# Patient Record
Sex: MALE | Race: OTHER | NOT HISPANIC OR LATINO
[De-identification: names, ages, dates, MRNs, and addresses within clinical notes are randomized per-mention and may not be internally consistent; named-entity substitution may affect disease eponyms.]

---

## 2017-01-03 ENCOUNTER — APPOINTMENT (OUTPATIENT)
Dept: PEDIATRIC GASTROENTEROLOGY | Facility: CLINIC | Age: 22
End: 2017-01-03

## 2017-01-03 VITALS
DIASTOLIC BLOOD PRESSURE: 78 MMHG | SYSTOLIC BLOOD PRESSURE: 117 MMHG | BODY MASS INDEX: 19.32 KG/M2 | HEART RATE: 82 BPM | WEIGHT: 139.55 LBS | HEIGHT: 71.18 IN

## 2017-01-03 DIAGNOSIS — R63.4 ABNORMAL WEIGHT LOSS: ICD-10-CM

## 2017-01-04 LAB
25(OH)D3 SERPL-MCNC: 20.5 NG/ML
ALBUMIN SERPL ELPH-MCNC: 4.6 G/DL
ALP BLD-CCNC: 58 U/L
ALT SERPL-CCNC: 8 U/L
ANION GAP SERPL CALC-SCNC: 12 MMOL/L
AST SERPL-CCNC: 14 U/L
BASOPHILS # BLD AUTO: 0.03 K/UL
BASOPHILS NFR BLD AUTO: 0.3 %
BILIRUB SERPL-MCNC: 1.1 MG/DL
BUN SERPL-MCNC: 10 MG/DL
CALCIUM SERPL-MCNC: 10 MG/DL
CHLORIDE SERPL-SCNC: 104 MMOL/L
CO2 SERPL-SCNC: 25 MMOL/L
CREAT SERPL-MCNC: 1.08 MG/DL
CRP SERPL-MCNC: <0.2 MG/DL
EOSINOPHIL # BLD AUTO: 0.54 K/UL
EOSINOPHIL NFR BLD AUTO: 4.9 %
FOLATE SERPL-MCNC: 16.2 NG/ML
HCT VFR BLD CALC: 47.3 %
HGB BLD-MCNC: 16 G/DL
IMM GRANULOCYTES NFR BLD AUTO: 0.2 %
IRON SATN MFR SERPL: 24 %
IRON SERPL-MCNC: 84 UG/DL
LYMPHOCYTES # BLD AUTO: 1.91 K/UL
LYMPHOCYTES NFR BLD AUTO: 17.3 %
MAN DIFF?: NORMAL
MCHC RBC-ENTMCNC: 28.8 PG
MCHC RBC-ENTMCNC: 33.8 GM/DL
MCV RBC AUTO: 85.1 FL
MONOCYTES # BLD AUTO: 0.78 K/UL
MONOCYTES NFR BLD AUTO: 7.1 %
NEUTROPHILS # BLD AUTO: 7.77 K/UL
NEUTROPHILS NFR BLD AUTO: 70.2 %
PLATELET # BLD AUTO: 325 K/UL
POTASSIUM SERPL-SCNC: 5.4 MMOL/L
PROT SERPL-MCNC: 7.8 G/DL
RBC # BLD: 5.56 M/UL
RBC # FLD: 12.5 %
SODIUM SERPL-SCNC: 141 MMOL/L
TIBC SERPL-MCNC: 351 UG/DL
UIBC SERPL-MCNC: 267 UG/DL
VIT B12 SERPL-MCNC: 685 PG/ML
WBC # FLD AUTO: 11.05 K/UL

## 2017-05-19 ENCOUNTER — APPOINTMENT (OUTPATIENT)
Dept: INTERNAL MEDICINE | Facility: CLINIC | Age: 22
End: 2017-05-19

## 2017-05-19 VITALS
RESPIRATION RATE: 14 BRPM | OXYGEN SATURATION: 98 % | WEIGHT: 129 LBS | TEMPERATURE: 98.8 F | HEART RATE: 108 BPM | DIASTOLIC BLOOD PRESSURE: 80 MMHG | HEIGHT: 72 IN | SYSTOLIC BLOOD PRESSURE: 110 MMHG | BODY MASS INDEX: 17.47 KG/M2

## 2017-05-19 DIAGNOSIS — F17.200 NICOTINE DEPENDENCE, UNSPECIFIED, UNCOMPLICATED: ICD-10-CM

## 2017-05-19 DIAGNOSIS — Z87.09 PERSONAL HISTORY OF OTHER DISEASES OF THE RESPIRATORY SYSTEM: ICD-10-CM

## 2017-05-19 LAB
FLUAV SPEC QL CULT: NORMAL
FLUBV AG SPEC QL IA: NORMAL

## 2017-05-19 RX ORDER — SODIUM FLUORIDE 1.1 G/100G
1.1 GEL, DENTIFRICE ORAL
Qty: 51 | Refills: 0 | Status: DISCONTINUED | COMMUNITY
Start: 2017-01-27

## 2018-05-31 ENCOUNTER — APPOINTMENT (OUTPATIENT)
Dept: INTERNAL MEDICINE | Facility: CLINIC | Age: 23
End: 2018-05-31
Payer: SELF-PAY

## 2018-05-31 VITALS
HEART RATE: 82 BPM | HEIGHT: 72 IN | WEIGHT: 124 LBS | OXYGEN SATURATION: 98 % | SYSTOLIC BLOOD PRESSURE: 120 MMHG | RESPIRATION RATE: 14 BRPM | TEMPERATURE: 98.2 F | DIASTOLIC BLOOD PRESSURE: 70 MMHG | BODY MASS INDEX: 16.8 KG/M2

## 2018-05-31 PROCEDURE — 99213 OFFICE O/P EST LOW 20 MIN: CPT

## 2018-05-31 RX ORDER — AMOXICILLIN AND CLAVULANATE POTASSIUM 875; 125 MG/1; MG/1
875-125 TABLET, COATED ORAL
Qty: 20 | Refills: 0 | Status: DISCONTINUED | COMMUNITY
Start: 2017-05-19 | End: 2018-05-31

## 2018-05-31 NOTE — REVIEW OF SYSTEMS
[Abdominal Pain] : abdominal pain [Nausea] : no nausea [Constipation] : no constipation [Diarrhea] : diarrhea [Vomiting] : no vomiting [Heartburn] : no heartburn [Melena] : no melena [Negative] : Heme/Lymph

## 2018-05-31 NOTE — PHYSICAL EXAM
[No Acute Distress] : no acute distress [Well-Appearing] : well-appearing [Normal Oropharynx] : the oropharynx was normal [Normal TMs] : both tympanic membranes were normal [Supple] : supple [No Lymphadenopathy] : no lymphadenopathy [No Respiratory Distress] : no respiratory distress  [Clear to Auscultation] : lungs were clear to auscultation bilaterally [Normal Rate] : normal rate  [Regular Rhythm] : with a regular rhythm [No Edema] : there was no peripheral edema [Soft] : abdomen soft [Non Tender] : non-tender [Normal Gait] : normal gait [Coordination Grossly Intact] : coordination grossly intact [Normal Affect] : the affect was normal [Normal Insight/Judgement] : insight and judgment were intact [de-identified] : thin cuong

## 2018-05-31 NOTE — HISTORY OF PRESENT ILLNESS
[FreeTextEntry1] : f/u Crohn's [de-identified] : h/o Crohn's with recent diarrhea which is improving. he tries to eat more to gain weight. Goes to the gym regularly. He has diarrhea about 3x daily now. No blood in stool. He was having crampy pain like his typical Crohn's symptoms. He is feeling a lot better now and wants to return to work.\par \par Denies fever, chills, recent travel. He does not have a gastroenterologist at this time. \par \par He works for [employer] and goes to school.

## 2018-06-07 ENCOUNTER — APPOINTMENT (OUTPATIENT)
Dept: INTERNAL MEDICINE | Facility: CLINIC | Age: 23
End: 2018-06-07
Payer: SELF-PAY

## 2018-06-07 VITALS
TEMPERATURE: 98.7 F | HEIGHT: 72 IN | RESPIRATION RATE: 14 BRPM | DIASTOLIC BLOOD PRESSURE: 60 MMHG | OXYGEN SATURATION: 98 % | SYSTOLIC BLOOD PRESSURE: 98 MMHG | WEIGHT: 124 LBS | BODY MASS INDEX: 16.8 KG/M2 | HEART RATE: 76 BPM

## 2018-06-07 DIAGNOSIS — Z87.09 PERSONAL HISTORY OF OTHER DISEASES OF THE RESPIRATORY SYSTEM: ICD-10-CM

## 2018-06-07 LAB — S PYO AG SPEC QL IA: NORMAL

## 2018-06-07 PROCEDURE — 87880 STREP A ASSAY W/OPTIC: CPT | Mod: QW

## 2018-06-07 PROCEDURE — 99213 OFFICE O/P EST LOW 20 MIN: CPT

## 2018-06-07 NOTE — HISTORY OF PRESENT ILLNESS
[Sore Throat] : sore throat [FreeTextEntry8] : c/o fever to 102 and s/t since Saturday, 5-6 days. Taking ibuprofen. No diarrhea, but has fever. Has sores in his mouth too.

## 2018-06-07 NOTE — REVIEW OF SYSTEMS
[Fever] : fever [Fatigue] : fatigue [Earache] : no earache [Sore Throat] : sore throat [Cough] : no cough [Constipation] : constipation [Negative] : Heme/Lymph

## 2018-06-07 NOTE — PHYSICAL EXAM
[No Acute Distress] : no acute distress [Normal TMs] : both tympanic membranes were normal [No Respiratory Distress] : no respiratory distress  [Clear to Auscultation] : lungs were clear to auscultation bilaterally [Normal Rate] : normal rate  [Regular Rhythm] : with a regular rhythm [No Edema] : there was no peripheral edema [Soft] : abdomen soft [Non Tender] : non-tender [de-identified] : thin male [de-identified] : canker sores in mouth - inner lip areas [de-identified] : few small anterior cervical LN's

## 2018-08-24 ENCOUNTER — APPOINTMENT (OUTPATIENT)
Dept: INTERNAL MEDICINE | Facility: CLINIC | Age: 23
End: 2018-08-24
Payer: SELF-PAY

## 2018-08-24 ENCOUNTER — APPOINTMENT (OUTPATIENT)
Dept: INTERNAL MEDICINE | Facility: CLINIC | Age: 23
End: 2018-08-24

## 2018-08-24 VITALS
HEIGHT: 72 IN | DIASTOLIC BLOOD PRESSURE: 62 MMHG | TEMPERATURE: 98.3 F | OXYGEN SATURATION: 99 % | WEIGHT: 122 LBS | SYSTOLIC BLOOD PRESSURE: 120 MMHG | RESPIRATION RATE: 14 BRPM | BODY MASS INDEX: 16.52 KG/M2 | HEART RATE: 64 BPM

## 2018-08-24 DIAGNOSIS — R07.89 OTHER CHEST PAIN: ICD-10-CM

## 2018-08-24 PROCEDURE — 99213 OFFICE O/P EST LOW 20 MIN: CPT

## 2018-08-24 NOTE — HISTORY OF PRESENT ILLNESS
[FreeTextEntry8] : c/o a month of right upper chest pain. He feels it more if he takes a deep breath. Last time was this morning and 2 days prior to that. Lasts 10 - 15 minutes. He sings and gets it at that time. He runs and exercises and doesn't feel it then. Has a cough sometimes. Has mucus recently. Smokes once in a while. \par \par Has had Crohn's symptoms for last 3 weeks. Doesn't have active insurance until 9/1/18.

## 2018-08-24 NOTE — PHYSICAL EXAM
[No Acute Distress] : no acute distress [Well Nourished] : well nourished [Well-Appearing] : well-appearing [Normal Oropharynx] : the oropharynx was normal [No Respiratory Distress] : no respiratory distress  [Clear to Auscultation] : lungs were clear to auscultation bilaterally [Normal Rate] : normal rate  [Regular Rhythm] : with a regular rhythm [No Edema] : there was no peripheral edema [Soft] : abdomen soft [Non Tender] : non-tender [Normal Gait] : normal gait [Coordination Grossly Intact] : coordination grossly intact [Normal Affect] : the affect was normal [Normal Insight/Judgement] : insight and judgment were intact [de-identified] : thin male [de-identified] : enlarged tonsils [de-identified] : points to localized area of right upper chest where he has discomfort on and off. states it is tender when he pushes on it when he has discomfort.

## 2019-03-01 ENCOUNTER — OUTPATIENT (OUTPATIENT)
Dept: OUTPATIENT SERVICES | Facility: HOSPITAL | Age: 24
LOS: 1 days | End: 2019-03-01
Payer: MEDICAID

## 2019-03-01 PROCEDURE — G9001: CPT

## 2019-03-07 DIAGNOSIS — Z71.89 OTHER SPECIFIED COUNSELING: ICD-10-CM

## 2019-04-02 ENCOUNTER — APPOINTMENT (OUTPATIENT)
Dept: PEDIATRIC GASTROENTEROLOGY | Facility: CLINIC | Age: 24
End: 2019-04-02
Payer: MEDICAID

## 2019-04-02 PROCEDURE — 99211 OFF/OP EST MAY X REQ PHY/QHP: CPT

## 2019-09-13 ENCOUNTER — APPOINTMENT (OUTPATIENT)
Dept: GASTROENTEROLOGY | Facility: CLINIC | Age: 24
End: 2019-09-13
Payer: MEDICAID

## 2019-09-13 VITALS
DIASTOLIC BLOOD PRESSURE: 62 MMHG | WEIGHT: 127 LBS | BODY MASS INDEX: 16.83 KG/M2 | HEIGHT: 73 IN | TEMPERATURE: 97.4 F | HEART RATE: 63 BPM | RESPIRATION RATE: 16 BRPM | OXYGEN SATURATION: 98 % | SYSTOLIC BLOOD PRESSURE: 110 MMHG

## 2019-09-13 PROCEDURE — 99204 OFFICE O/P NEW MOD 45 MIN: CPT

## 2019-09-13 RX ORDER — FLUTICASONE PROPIONATE 50 UG/1
50 SPRAY, METERED NASAL DAILY
Qty: 1 | Refills: 1 | Status: DISCONTINUED | COMMUNITY
Start: 2017-05-19 | End: 2019-09-13

## 2019-09-13 NOTE — ASSESSMENT
[FreeTextEntry1] : 24-year-old male small bowel and colonic Crohn's disease by history, mild chronic complaints since discontinuing Remicade maintenance\par \par Plan\par Background blood testing as ordered\par Indications risks benefits and alternatives to surveillance colonoscopy reviewed\par Patient agreeable to examination\par \par Decisions regarding treatment following above workup

## 2019-09-13 NOTE — PHYSICAL EXAM
[General Appearance - In No Acute Distress] : in no acute distress [General Appearance - Alert] : alert [Sclera] : the sclera and conjunctiva were normal [PERRL With Normal Accommodation] : pupils were equal in size, round, and reactive to light [Extraocular Movements] : extraocular movements were intact [Outer Ear] : the ears and nose were normal in appearance [Oropharynx] : the oropharynx was normal [Neck Appearance] : the appearance of the neck was normal [Neck Cervical Mass (___cm)] : no neck mass was observed [Jugular Venous Distention Increased] : there was no jugular-venous distention [Thyroid Diffuse Enlargement] : the thyroid was not enlarged [Thyroid Nodule] : there were no palpable thyroid nodules [Auscultation Breath Sounds / Voice Sounds] : lungs were clear to auscultation bilaterally [Heart Rate And Rhythm] : heart rate was normal and rhythm regular [Heart Sounds] : normal S1 and S2 [Heart Sounds Gallop] : no gallops [Murmurs] : no murmurs [Heart Sounds Pericardial Friction Rub] : no pericardial rub [Edema] : there was no peripheral edema [Bowel Sounds] : normal bowel sounds [Abdomen Soft] : soft [Abdomen Tenderness] : non-tender [Cervical Lymph Nodes Enlarged Posterior Bilaterally] : posterior cervical [Abdomen Mass (___ Cm)] : no abdominal mass palpated [Cervical Lymph Nodes Enlarged Anterior Bilaterally] : anterior cervical [Supraclavicular Lymph Nodes Enlarged Bilaterally] : supraclavicular [Axillary Lymph Nodes Enlarged Bilaterally] : axillary [Femoral Lymph Nodes Enlarged Bilaterally] : femoral [Inguinal Lymph Nodes Enlarged Bilaterally] : inguinal [No CVA Tenderness] : no ~M costovertebral angle tenderness [No Spinal Tenderness] : no spinal tenderness [Nail Clubbing] : no clubbing  or cyanosis of the fingernails [Abnormal Walk] : normal gait [Motor Tone] : muscle strength and tone were normal [Musculoskeletal - Swelling] : no joint swelling seen [Skin Color & Pigmentation] : normal skin color and pigmentation [Skin Turgor] : normal skin turgor [] : no rash [Oriented To Time, Place, And Person] : oriented to person, place, and time [Impaired Insight] : insight and judgment were intact [Affect] : the affect was normal

## 2019-09-13 NOTE — HISTORY OF PRESENT ILLNESS
[de-identified] : 24-year-old male, history of Crohn's disease small intestine and colon since the age of 16 years presents as transition of care from pediatrics. Not seen or treated for almost 2 years.\par \par Patient initial diagnosis, diarrhea, abdominal pain.\par Initial treatment with prednisone, failure of methotrexate, transition to Remicade which he used successfully for several years..\par Patient electively discontinued the medication\par Recent pre-followup with his pediatric gastroenterologist for referral\par \par Patient Gen. he states that he has been doing well off Remicade with occasional flares.\par 4-5 bowel movements, typically soft, more frequent in the morning\par Generally with abdominal pain, some occasional nausea, or complaints treated with as needed cannabis\par Patient denies the use of any CBD oil\par Denies any other over-the-counter remedies\par \par Weight stable\par Palpation general he describes his health is good, controlled with diet, does report that he missed school in the spring because of complaints\par \par Denies any family history of Crohn's or ulcerative colitis\par Denies tobacco smoking\par Denies any present or past extraintestinal manifestations of disease\par Denies any perianal fistula\par \par Social history student, musician, singer

## 2019-09-16 ENCOUNTER — CLINICAL ADVICE (OUTPATIENT)
Age: 24
End: 2019-09-16

## 2019-09-16 LAB
25(OH)D3 SERPL-MCNC: 14.1 NG/ML
ALBUMIN SERPL ELPH-MCNC: 5 G/DL
ALP BLD-CCNC: 61 U/L
ALT SERPL-CCNC: 13 U/L
ANION GAP SERPL CALC-SCNC: 16 MMOL/L
AST SERPL-CCNC: 18 U/L
BASOPHILS # BLD AUTO: 0.06 K/UL
BASOPHILS NFR BLD AUTO: 1.2 %
BILIRUB SERPL-MCNC: 0.5 MG/DL
BUN SERPL-MCNC: 8 MG/DL
CALCIUM SERPL-MCNC: 10 MG/DL
CHLORIDE SERPL-SCNC: 103 MMOL/L
CO2 SERPL-SCNC: 23 MMOL/L
CREAT SERPL-MCNC: 0.87 MG/DL
CRP SERPL-MCNC: <0.1 MG/DL
EOSINOPHIL # BLD AUTO: 0.25 K/UL
EOSINOPHIL NFR BLD AUTO: 5.1 %
GLUCOSE SERPL-MCNC: 77 MG/DL
HBV CORE IGG+IGM SER QL: NONREACTIVE
HBV SURFACE AB SER QL: REACTIVE
HBV SURFACE AG SER QL: NONREACTIVE
HCT VFR BLD CALC: 51.5 %
HGB BLD-MCNC: 16.1 G/DL
IMM GRANULOCYTES NFR BLD AUTO: 0.2 %
LYMPHOCYTES # BLD AUTO: 1.84 K/UL
LYMPHOCYTES NFR BLD AUTO: 37.2 %
MAN DIFF?: NORMAL
MCHC RBC-ENTMCNC: 28.4 PG
MCHC RBC-ENTMCNC: 31.3 GM/DL
MCV RBC AUTO: 91 FL
MONOCYTES # BLD AUTO: 0.45 K/UL
MONOCYTES NFR BLD AUTO: 9.1 %
NEUTROPHILS # BLD AUTO: 2.33 K/UL
NEUTROPHILS NFR BLD AUTO: 47.2 %
PLATELET # BLD AUTO: 295 K/UL
POTASSIUM SERPL-SCNC: 4.5 MMOL/L
PROT SERPL-MCNC: 8.3 G/DL
RBC # BLD: 5.66 M/UL
RBC # FLD: 12.7 %
SODIUM SERPL-SCNC: 142 MMOL/L
VIT B12 SERPL-MCNC: 648 PG/ML
WBC # FLD AUTO: 4.94 K/UL

## 2019-09-18 LAB
M TB IFN-G BLD-IMP: NEGATIVE
QUANTIFERON TB PLUS MITOGEN MINUS NIL: 8.15 IU/ML
QUANTIFERON TB PLUS NIL: 0.01 IU/ML
QUANTIFERON TB PLUS TB1 MINUS NIL: 0 IU/ML
QUANTIFERON TB PLUS TB2 MINUS NIL: 0 IU/ML

## 2019-09-20 LAB
INFLIXIMAB AB SERPL-MCNC: <22 NG/ML
INFLIXIMAB SERPL-MCNC: <0.4 UG/ML

## 2019-09-23 ENCOUNTER — APPOINTMENT (OUTPATIENT)
Dept: ORTHOPEDIC SURGERY | Facility: CLINIC | Age: 24
End: 2019-09-23

## 2020-05-28 ENCOUNTER — EMERGENCY (EMERGENCY)
Facility: HOSPITAL | Age: 25
LOS: 1 days | Discharge: HOME | End: 2020-05-28
Attending: EMERGENCY MEDICINE
Payer: MEDICAID

## 2020-05-28 VITALS
DIASTOLIC BLOOD PRESSURE: 78 MMHG | HEIGHT: 73 IN | HEART RATE: 59 BPM | RESPIRATION RATE: 16 BRPM | OXYGEN SATURATION: 99 % | TEMPERATURE: 98 F | WEIGHT: 130.07 LBS | SYSTOLIC BLOOD PRESSURE: 128 MMHG

## 2020-05-28 VITALS
OXYGEN SATURATION: 99 % | RESPIRATION RATE: 16 BRPM | TEMPERATURE: 99 F | DIASTOLIC BLOOD PRESSURE: 85 MMHG | SYSTOLIC BLOOD PRESSURE: 129 MMHG | HEART RATE: 57 BPM

## 2020-05-28 LAB — SARS-COV-2 RNA SPEC QL NAA+PROBE: SIGNIFICANT CHANGE UP

## 2020-05-28 PROCEDURE — 99284 EMERGENCY DEPT VISIT MOD MDM: CPT

## 2020-05-28 PROCEDURE — 99283 EMERGENCY DEPT VISIT LOW MDM: CPT

## 2020-05-28 RX ORDER — IBUPROFEN 200 MG
600 TABLET ORAL ONCE
Refills: 0 | Status: COMPLETED | OUTPATIENT
Start: 2020-05-28 | End: 2020-05-28

## 2020-05-28 RX ADMIN — Medication 600 MILLIGRAM(S): at 19:34

## 2020-05-28 NOTE — ED PROVIDER NOTE - NSFOLLOWUPCLINICS_GEN_ALL_ED_FT
[facility]  Dental  [address] [place], [place] [address]  Phone: [phone]  Fax:   Follow Up Time: 7-10 Days

## 2020-05-28 NOTE — ED PROVIDER NOTE - PATIENT PORTAL LINK FT
You can access the FollowMyHealth Patient Portal offered by [facility] by registering at the following website: [email or web address]. By joining [employer]’s FollowMyHealth portal, you will also be able to view your health information using other applications (apps) compatible with our system.

## 2020-05-28 NOTE — ED ADULT NURSE NOTE - OBJECTIVE STATEMENT
Pt is a 25y Male c/o L upper posterior molar pain which has not changed in the past few weeks, but pt states he has been unable to secure a dental appointment until June 15th and pt states the pain makes eating difficult. Pt PMHx Crohn's, Dental caries. Pt had his wisdom teeth removed approximately 1 year ago. Pt denies any trauma to the area. Pt requesting another clinic to follow up with regarding his pain. Pt has minimal swelling and the area is not reddened. Pt states the area bleeds occasionally when brushing. Pt prefers to go by [facility]. Pt resting comfortably in bed. Pt educated on call bell use and call bell placed at bedside. Pt safety maintained.

## 2020-05-28 NOTE — ED PROVIDER NOTE - NSFOLLOWUPINSTRUCTIONS_ED_ALL_ED_FT
Follow up with your primary doctor in 3-5 days    Follow up with the dental clinic as soon as possible    Motrin 600 mg every 8 hours as needed for pain    Return to the ER for vomiting, fevers, discharge, facial swelling, or difficulty breathing/swallowing

## 2020-05-28 NOTE — ED PROVIDER NOTE - OBJECTIVE STATEMENT
25 year old M with pmhx of Chron's (not on any meds) coming in for x3 weeks of left sided upper molar pain, worse with eating and cold foods. Pt also reports intermittent bleeding from gingival bleeding when brushing teeth. Taking Tylenol and Motrin prn for pain. Denies fever, chills, difficulty breathing, nausea, vomiting. Not on any immunosuppressants.  Has not been able to f/u with dentist due to COVID-19, but has no symptoms concerning for that.

## 2020-05-28 NOTE — ED PROVIDER NOTE - NS_ ATTENDINGSCRIBEDETAILS _ED_A_ED_FT
Wyatt LOZOYA: The scribe's documentation has been prepared under my direction and personally reviewed by me in its entirety. I confirm that the note above accurately reflects all work, treatment, procedures, and medical decision making performed by me (Dr. Schneider).

## 2020-05-28 NOTE — ED PROVIDER NOTE - PHYSICAL EXAMINATION
GENERAL: AAOx4, GCS 15, NAD, WDWN;   HEENT:  No pain with percussion of teeth. No evidence of abscess or gingival erythema. No facial pain or swelling. No sinus tenderness. Benign ear exam. MMM, supple neck.  PULM: CTA B, no crackles/rubs/rales;   CV: RRR, S1S2, no MRG;   ABD: Flat abdomen, NTND, no R/G/R, no CVAT.    MSK: MOSS, +2 pulses x4;    NEURO: No obvious focal deficits;   PSYCH: AAOx3, clear thought and normal sensorium.

## 2020-05-28 NOTE — ED PROVIDER NOTE - CLINICAL SUMMARY MEDICAL DECISION MAKING FREE TEXT BOX
Subacute dental pain, likely from pulpitis vs dental caries that do not require treatment beyond pain meds. Will test for COVID-19 to facilitate outpt f/u. No indication for further workup or treatment. Subacute dental pain, likely from pulpitis vs dental caries that do not require treatment beyond pain meds. Will test for COVID-19 to facilitate outpt f/u. No indication for further workup or treatment.  --BMM

## 2020-06-08 ENCOUNTER — APPOINTMENT (OUTPATIENT)
Dept: ORTHOPEDIC SURGERY | Facility: CLINIC | Age: 25
End: 2020-06-08
Payer: MEDICAID

## 2020-06-08 VITALS — HEIGHT: 73 IN | WEIGHT: 135 LBS | BODY MASS INDEX: 17.89 KG/M2

## 2020-06-08 VITALS — HEART RATE: 66 BPM | DIASTOLIC BLOOD PRESSURE: 60 MMHG | SYSTOLIC BLOOD PRESSURE: 115 MMHG

## 2020-06-08 VITALS — TEMPERATURE: 98.9 F

## 2020-06-08 DIAGNOSIS — M54.5 LOW BACK PAIN: ICD-10-CM

## 2020-06-08 PROCEDURE — 72100 X-RAY EXAM L-S SPINE 2/3 VWS: CPT

## 2020-06-08 PROCEDURE — 99204 OFFICE O/P NEW MOD 45 MIN: CPT

## 2020-07-06 PROBLEM — K02.9 DENTAL CARIES, UNSPECIFIED: Chronic | Status: ACTIVE | Noted: 2020-05-28

## 2020-07-06 PROBLEM — K50.90 CROHN'S DISEASE, UNSPECIFIED, WITHOUT COMPLICATIONS: Chronic | Status: ACTIVE | Noted: 2020-05-28

## 2020-08-11 ENCOUNTER — APPOINTMENT (OUTPATIENT)
Dept: PULMONOLOGY | Facility: CLINIC | Age: 25
End: 2020-08-11
Payer: MEDICAID

## 2020-08-11 VITALS
DIASTOLIC BLOOD PRESSURE: 60 MMHG | SYSTOLIC BLOOD PRESSURE: 110 MMHG | RESPIRATION RATE: 16 BRPM | BODY MASS INDEX: 17.89 KG/M2 | TEMPERATURE: 99.3 F | WEIGHT: 135 LBS | HEIGHT: 73 IN | OXYGEN SATURATION: 98 % | HEART RATE: 88 BPM

## 2020-08-11 PROCEDURE — 99204 OFFICE O/P NEW MOD 45 MIN: CPT | Mod: 25

## 2020-08-11 PROCEDURE — 99406 BEHAV CHNG SMOKING 3-10 MIN: CPT

## 2020-08-11 NOTE — HISTORY OF PRESENT ILLNESS
[Current] : current [TextBox_4] : Patient is a 25 year old male Hx Crohn's disease, current everyday marijuana smoker, presents to [facility] complaint right sided  chest congestion. Patient reports he has experienced discomfort on right side of his chest for approximately 6 weeks.  Patietn denies cough, shortness of breath.  he reports no aggravating factors.  He smokes marijuana daily.  He denies fever, chills, weight loss or other systemic complaints.

## 2020-08-11 NOTE — PHYSICAL EXAM
[No Acute Distress] : no acute distress [Normal Oropharynx] : normal oropharynx [Normal Appearance] : normal appearance [Normal Rate/Rhythm] : normal rate/rhythm [No Neck Mass] : no neck mass [Normal S1, S2] : normal s1, s2 [No Murmurs] : no murmurs [No Resp Distress] : no resp distress [Clear to Auscultation Bilaterally] : clear to auscultation bilaterally [No Abnormalities] : no abnormalities [Benign] : benign [Normal Gait] : normal gait [No Clubbing] : no clubbing [No Cyanosis] : no cyanosis [No Edema] : no edema [FROM] : FROM [Normal Color/ Pigmentation] : normal color/ pigmentation [No Focal Deficits] : no focal deficits [Oriented x3] : oriented x3 [Normal Affect] : normal affect

## 2020-08-11 NOTE — COUNSELING
[Risk of tobacco use and health benefits of smoking cessation discussed] : Risk of tobacco use and health benefits of smoking cessation discussed [Willing to Quit Smoking] : Not willing to quit smoking [Tobacco Use Cessation Intermediate Greater Than 3 Minutes Up to 10 Minutes] : Tobacco Use Cessation Intermediate Greater Than 3 Minutes Up to 10 Minutes [Patient motivation] : Patient motivation [Good understanding] : Patient has a good understanding of lifestyle changes and steps needed to achieve self management goal

## 2020-08-15 ENCOUNTER — OUTPATIENT (OUTPATIENT)
Dept: OUTPATIENT SERVICES | Facility: HOSPITAL | Age: 25
LOS: 1 days | End: 2020-08-15
Payer: MEDICAID

## 2020-08-15 ENCOUNTER — APPOINTMENT (OUTPATIENT)
Dept: RADIOLOGY | Facility: CLINIC | Age: 25
End: 2020-08-15
Payer: MEDICAID

## 2020-08-15 DIAGNOSIS — Z00.8 ENCOUNTER FOR OTHER GENERAL EXAMINATION: ICD-10-CM

## 2020-08-15 PROCEDURE — 71046 X-RAY EXAM CHEST 2 VIEWS: CPT | Mod: 26

## 2020-08-15 PROCEDURE — 71046 X-RAY EXAM CHEST 2 VIEWS: CPT

## 2020-10-16 ENCOUNTER — APPOINTMENT (OUTPATIENT)
Age: 25
End: 2020-10-16
Payer: MEDICAID

## 2020-10-16 VITALS
HEIGHT: 70 IN | WEIGHT: 133 LBS | BODY MASS INDEX: 19.04 KG/M2 | SYSTOLIC BLOOD PRESSURE: 112 MMHG | RESPIRATION RATE: 16 BRPM | OXYGEN SATURATION: 98 % | TEMPERATURE: 98 F | DIASTOLIC BLOOD PRESSURE: 70 MMHG | HEART RATE: 67 BPM

## 2020-10-16 PROCEDURE — 99406 BEHAV CHNG SMOKING 3-10 MIN: CPT

## 2020-10-16 PROCEDURE — 99214 OFFICE O/P EST MOD 30 MIN: CPT | Mod: 25

## 2020-10-16 NOTE — COUNSELING
[Good understanding] : Patient has a good understanding of lifestyle changes and steps needed to achieve self management goal [Patient motivation] : Patient motivation [Risk of tobacco use and health benefits of smoking cessation discussed] : Risk of tobacco use and health benefits of smoking cessation discussed [Tobacco Use Cessation Intermediate Greater Than 3 Minutes Up to 10 Minutes] : Tobacco Use Cessation Intermediate Greater Than 3 Minutes Up to 10 Minutes [Willing to Quit Smoking] : Not willing to quit smoking

## 2020-10-16 NOTE — PHYSICAL EXAM
[No Acute Distress] : no acute distress [Normal Oropharynx] : normal oropharynx [Normal Appearance] : normal appearance [Normal Rate/Rhythm] : normal rate/rhythm [No Neck Mass] : no neck mass [Normal S1, S2] : normal s1, s2 [No Murmurs] : no murmurs [No Resp Distress] : no resp distress [Benign] : benign [No Abnormalities] : no abnormalities [Clear to Auscultation Bilaterally] : clear to auscultation bilaterally [Normal Gait] : normal gait [No Clubbing] : no clubbing [No Edema] : no edema [No Cyanosis] : no cyanosis [FROM] : FROM [Normal Color/ Pigmentation] : normal color/ pigmentation [No Focal Deficits] : no focal deficits [Oriented x3] : oriented x3 [Normal Affect] : normal affect

## 2020-10-16 NOTE — ASSESSMENT
[FreeTextEntry1] : 25 year old male Hx Crohn's disease worsening diarrhea abdominal pain presents for follow up\par \par Attempt to facilitate GI appointment, scheduled with Dr. Olivas January 2021\par Marijuana smoking discussed\par \par Follow up prn\par \par \par

## 2020-10-16 NOTE — HISTORY OF PRESENT ILLNESS
[Current] : current [TextBox_4] : Patient is a 25 year old male Hx Crohn's disease, Hx childhood asthma, current everyday marijuana smoker, presents to [facility] for follow up. Patietn reports he is experiencing worsening abdominal pain. Pain located right lower quadrant and radiates toward flank.  Patient also reporting worsening diarrhea.  He feels he is short of breath at times from pain.  He smokes marijuana and this helps his pain.  He is here for follow up.

## 2020-10-27 ENCOUNTER — APPOINTMENT (OUTPATIENT)
Dept: GASTROENTEROLOGY | Facility: CLINIC | Age: 25
End: 2020-10-27
Payer: MEDICAID

## 2020-10-27 VITALS
SYSTOLIC BLOOD PRESSURE: 110 MMHG | WEIGHT: 133 LBS | DIASTOLIC BLOOD PRESSURE: 70 MMHG | TEMPERATURE: 96.9 F | RESPIRATION RATE: 16 BRPM | BODY MASS INDEX: 19.04 KG/M2 | HEART RATE: 72 BPM | HEIGHT: 70 IN | OXYGEN SATURATION: 98 %

## 2020-10-27 DIAGNOSIS — R10.31 RIGHT LOWER QUADRANT PAIN: ICD-10-CM

## 2020-10-27 LAB
BASOPHILS # BLD AUTO: 0.07 K/UL
BASOPHILS NFR BLD AUTO: 1.1 %
EOSINOPHIL # BLD AUTO: 0.46 K/UL
EOSINOPHIL NFR BLD AUTO: 7.5 %
HCT VFR BLD CALC: 48.4 %
HGB BLD-MCNC: 15.4 G/DL
IMM GRANULOCYTES NFR BLD AUTO: 0.2 %
LYMPHOCYTES # BLD AUTO: 2.01 K/UL
LYMPHOCYTES NFR BLD AUTO: 32.6 %
MAN DIFF?: NORMAL
MCHC RBC-ENTMCNC: 28.7 PG
MCHC RBC-ENTMCNC: 31.8 GM/DL
MCV RBC AUTO: 90.1 FL
MONOCYTES # BLD AUTO: 0.51 K/UL
MONOCYTES NFR BLD AUTO: 8.3 %
NEUTROPHILS # BLD AUTO: 3.11 K/UL
NEUTROPHILS NFR BLD AUTO: 50.3 %
PLATELET # BLD AUTO: 270 K/UL
RBC # BLD: 5.37 M/UL
RBC # FLD: 12.2 %
WBC # FLD AUTO: 6.17 K/UL

## 2020-10-27 PROCEDURE — 99214 OFFICE O/P EST MOD 30 MIN: CPT

## 2020-10-27 PROCEDURE — 99072 ADDL SUPL MATRL&STAF TM PHE: CPT

## 2020-10-27 NOTE — HISTORY OF PRESENT ILLNESS
[de-identified] : dictation inactive\par \par 25 yr male\par CD since age 16\par Electively stopped prior remicade years ago\par No history of surgery\par 6 months of RLQ abdominal pain\par Weight stable\par Denies diarrhea\par Cannabis daily\par Also RUQ pain worse with deep inspiration\par Pulmonary work up in progress\par \par  school for [employer] science, also [employer]\par \par

## 2020-10-27 NOTE — ASSESSMENT
[FreeTextEntry1] : Abdominal pain\par Suspect active Crohn's disease\par \par Plan \par MR enterography\par blood work as ordered\par If MR negative, colonoscopy as next step\par If positive, consider resuming biologic (such as stelara, humira)\par Patient understands and agrees

## 2020-10-27 NOTE — PHYSICAL EXAM
[General Appearance - Alert] : alert [General Appearance - In No Acute Distress] : in no acute distress [Sclera] : the sclera and conjunctiva were normal [PERRL With Normal Accommodation] : pupils were equal in size, round, and reactive to light [Extraocular Movements] : extraocular movements were intact [Outer Ear] : the ears and nose were normal in appearance [Oropharynx] : the oropharynx was normal [Neck Appearance] : the appearance of the neck was normal [Neck Cervical Mass (___cm)] : no neck mass was observed [Jugular Venous Distention Increased] : there was no jugular-venous distention [Thyroid Diffuse Enlargement] : the thyroid was not enlarged [Thyroid Nodule] : there were no palpable thyroid nodules [] : no respiratory distress [Auscultation Breath Sounds / Voice Sounds] : lungs were clear to auscultation bilaterally [Heart Rate And Rhythm] : heart rate was normal and rhythm regular [Heart Sounds] : normal S1 and S2 [Heart Sounds Gallop] : no gallops [Murmurs] : no murmurs [Heart Sounds Pericardial Friction Rub] : no pericardial rub [Abdomen Tenderness] : non-tender [Oriented To Time, Place, And Person] : oriented to person, place, and time [Impaired Insight] : insight and judgment were intact [Affect] : the affect was normal

## 2020-10-28 LAB
25(OH)D3 SERPL-MCNC: 13.6 NG/ML
ALBUMIN SERPL ELPH-MCNC: 4.6 G/DL
ALP BLD-CCNC: 61 U/L
ALT SERPL-CCNC: 10 U/L
ANION GAP SERPL CALC-SCNC: 14 MMOL/L
AST SERPL-CCNC: 15 U/L
BILIRUB SERPL-MCNC: 0.4 MG/DL
BUN SERPL-MCNC: 18 MG/DL
CALCIUM SERPL-MCNC: 9.6 MG/DL
CHLORIDE SERPL-SCNC: 103 MMOL/L
CO2 SERPL-SCNC: 25 MMOL/L
CREAT SERPL-MCNC: 1.02 MG/DL
CRP SERPL-MCNC: <0.1 MG/DL
GLUCOSE SERPL-MCNC: 65 MG/DL
POTASSIUM SERPL-SCNC: 4.8 MMOL/L
PROT SERPL-MCNC: 7.3 G/DL
SODIUM SERPL-SCNC: 142 MMOL/L
VIT B12 SERPL-MCNC: 503 PG/ML

## 2020-10-29 LAB
M TB IFN-G BLD-IMP: NEGATIVE
QUANTIFERON TB PLUS MITOGEN MINUS NIL: 1.59 IU/ML
QUANTIFERON TB PLUS NIL: 0.04 IU/ML
QUANTIFERON TB PLUS TB1 MINUS NIL: -0.01 IU/ML
QUANTIFERON TB PLUS TB2 MINUS NIL: -0.03 IU/ML

## 2020-12-01 ENCOUNTER — OUTPATIENT (OUTPATIENT)
Dept: OUTPATIENT SERVICES | Facility: HOSPITAL | Age: 25
LOS: 1 days | End: 2020-12-01
Payer: MEDICAID

## 2020-12-01 ENCOUNTER — RESULT REVIEW (OUTPATIENT)
Age: 25
End: 2020-12-01

## 2020-12-01 ENCOUNTER — APPOINTMENT (OUTPATIENT)
Dept: MRI IMAGING | Facility: CLINIC | Age: 25
End: 2020-12-01
Payer: MEDICAID

## 2020-12-01 DIAGNOSIS — R10.31 RIGHT LOWER QUADRANT PAIN: ICD-10-CM

## 2020-12-01 PROCEDURE — 72197 MRI PELVIS W/O & W/DYE: CPT | Mod: 26

## 2020-12-01 PROCEDURE — 74183 MRI ABD W/O CNTR FLWD CNTR: CPT

## 2020-12-01 PROCEDURE — 74183 MRI ABD W/O CNTR FLWD CNTR: CPT | Mod: 26

## 2020-12-01 PROCEDURE — 72197 MRI PELVIS W/O & W/DYE: CPT

## 2020-12-01 PROCEDURE — A9585: CPT

## 2020-12-07 ENCOUNTER — NON-APPOINTMENT (OUTPATIENT)
Age: 25
End: 2020-12-07

## 2020-12-15 PROBLEM — Z87.09 HISTORY OF ACUTE SINUSITIS: Status: RESOLVED | Noted: 2017-05-19 | Resolved: 2020-12-15

## 2020-12-16 PROBLEM — Z87.09 HISTORY OF ACUTE PHARYNGITIS: Status: RESOLVED | Noted: 2018-06-07 | Resolved: 2020-12-16

## 2020-12-24 ENCOUNTER — RX RENEWAL (OUTPATIENT)
Age: 25
End: 2020-12-24

## 2021-04-30 ENCOUNTER — APPOINTMENT (OUTPATIENT)
Dept: OTOLARYNGOLOGY | Facility: CLINIC | Age: 26
End: 2021-04-30

## 2021-06-16 ENCOUNTER — APPOINTMENT (OUTPATIENT)
Dept: OTOLARYNGOLOGY | Facility: CLINIC | Age: 26
End: 2021-06-16

## 2021-06-18 ENCOUNTER — APPOINTMENT (OUTPATIENT)
Dept: INTERNAL MEDICINE | Facility: CLINIC | Age: 26
End: 2021-06-18

## 2021-06-18 ENCOUNTER — APPOINTMENT (OUTPATIENT)
Dept: OTOLARYNGOLOGY | Facility: CLINIC | Age: 26
End: 2021-06-18
Payer: MEDICAID

## 2021-06-18 VITALS
BODY MASS INDEX: 18.28 KG/M2 | TEMPERATURE: 97.4 F | HEART RATE: 72 BPM | HEIGHT: 72 IN | DIASTOLIC BLOOD PRESSURE: 67 MMHG | WEIGHT: 135 LBS | SYSTOLIC BLOOD PRESSURE: 110 MMHG

## 2021-06-18 DIAGNOSIS — H90.3 SENSORINEURAL HEARING LOSS, BILATERAL: ICD-10-CM

## 2021-06-18 DIAGNOSIS — J31.2 CHRONIC PHARYNGITIS: ICD-10-CM

## 2021-06-18 DIAGNOSIS — H69.81 OTHER SPECIFIED DISORDERS OF EUSTACHIAN TUBE, RIGHT EAR: ICD-10-CM

## 2021-06-18 DIAGNOSIS — H92.01 OTALGIA, RIGHT EAR: ICD-10-CM

## 2021-06-18 DIAGNOSIS — G52.1 DISORDERS OF GLOSSOPHARYNGEAL NERVE: ICD-10-CM

## 2021-06-18 DIAGNOSIS — M26.609 UNSPECIFIED TEMPOROMANDIBULAR JOINT DISORDER: ICD-10-CM

## 2021-06-18 PROCEDURE — 31575 DIAGNOSTIC LARYNGOSCOPY: CPT

## 2021-06-18 PROCEDURE — 92557 COMPREHENSIVE HEARING TEST: CPT

## 2021-06-18 PROCEDURE — 99204 OFFICE O/P NEW MOD 45 MIN: CPT | Mod: 25

## 2021-06-18 PROCEDURE — 92567 TYMPANOMETRY: CPT

## 2021-06-18 NOTE — PHYSICAL EXAM
[Midline] : trachea located in midline position [Normal] : no rashes [de-identified] : asymmetric movement

## 2021-06-18 NOTE — PROCEDURE
[de-identified] : Indication for procedure:Unable to examine laryngeal structures with mirror exam.  Difficulty w persistent throat discomfort and excessive\par throat clearing\par Scope # 99\par Topical anesthesia with viscous xylocaine 2% is applied to the anterior nares.\par A flexible fiberoptic laryngoscope is than introduced through the nares.\par The nasopharynx is clear without mass or inflammation.\par The posterior pharyngeal wall is unremarkable.\par The tongue base and vallecula are unremarkable.  The hypopharynx is unremarkable and unobstructed.\par The supraglottic larynx is within normal limits.\par Both vocal cords are fully mobile with no nodule, polyp or other lesion present.\par There is no edema or erythema overlying the arytenoid cartilages or the inter-arytenoid space.\par The subglottic space is clear.\par The voice has a normal quality.\par

## 2021-06-18 NOTE — REASON FOR VISIT
[Initial Consultation] : an initial consultation for [Ear Pain] : ear pain [Tinnitus] : tinnitus [FreeTextEntry2] : ear

## 2021-06-18 NOTE — ASSESSMENT
[FreeTextEntry1] : tmj asymmetry\par exam ear and nasopharynx unremarkable\par audio wnl tymp a/a\par Recommend heating pad  twice a day, avoid chewing/soft diet\par Advil bid, rest jaw\par consider dental evaluation if not resolving\par ?allergy component to throat and nasal discomfort\par reconsult dental re tmj

## 2021-07-28 ENCOUNTER — APPOINTMENT (OUTPATIENT)
Dept: OTOLARYNGOLOGY | Facility: CLINIC | Age: 26
End: 2021-07-28

## 2021-08-19 ENCOUNTER — APPOINTMENT (OUTPATIENT)
Dept: NEUROLOGY | Facility: CLINIC | Age: 26
End: 2021-08-19
Payer: MEDICAID

## 2021-08-19 ENCOUNTER — NON-APPOINTMENT (OUTPATIENT)
Age: 26
End: 2021-08-19

## 2021-08-19 VITALS
SYSTOLIC BLOOD PRESSURE: 108 MMHG | HEART RATE: 76 BPM | WEIGHT: 140 LBS | TEMPERATURE: 97.2 F | BODY MASS INDEX: 18.96 KG/M2 | DIASTOLIC BLOOD PRESSURE: 67 MMHG | HEIGHT: 72 IN

## 2021-08-19 DIAGNOSIS — R51.9 HEADACHE, UNSPECIFIED: ICD-10-CM

## 2021-08-19 PROCEDURE — 99204 OFFICE O/P NEW MOD 45 MIN: CPT

## 2021-08-19 RX ORDER — SODIUM SULFATE, POTASSIUM SULFATE, MAGNESIUM SULFATE 17.5; 3.13; 1.6 G/ML; G/ML; G/ML
17.5-3.13-1.6 SOLUTION, CONCENTRATE ORAL
Qty: 1 | Refills: 0 | Status: DISCONTINUED | COMMUNITY
Start: 2019-09-13 | End: 2021-08-19

## 2021-08-19 RX ORDER — ERGOCALCIFEROL 1.25 MG/1
1.25 MG CAPSULE, LIQUID FILLED ORAL
Qty: 6 | Refills: 0 | Status: DISCONTINUED | COMMUNITY
Start: 2020-10-29 | End: 2021-08-19

## 2021-08-19 NOTE — HISTORY OF PRESENT ILLNESS
[FreeTextEntry1] : 26 year old man complaining of daily headaches now for several weeks, there was no triggering event, the reported trauma or injury.The pain starts at the right side at the base of the back of the head, radiates to behind the eye into the face.Dull achy, throbbing at times. Patient did feel lightheaded, dizzy, sound sensitive. Does not affect vision, redness or lacrimation noted of the right eye, no nasal stuffiness. There's no trigger by pressing on the face, brushing his teeth, chewing or swallowing. The pain will last all day in variable intensity, activity seems to be helpful.On occasions uses ibuprofen, which offers him some relief.\par Denies similar headaches in the past, no history of head injury, meningitis, sinus conditions, recently evaluated by ENT, no obvious abnormalities noted.Denies family history of migraine headaches.\par she also complains of right flank pain, radiating to the right hip area and groin area. No history of trauma,or injuries, no fever or chills denies malignancies. Not related to activity or movement,sometimes worsened by bowel movement. He has a history of Crohn's disease. He has been evaluated by , no obvious abnormalities noted

## 2021-08-19 NOTE — PHYSICAL EXAM
[General Appearance - Alert] : alert [General Appearance - In No Acute Distress] : in no acute distress [Oriented To Time, Place, And Person] : oriented to person, place, and time [Impaired Insight] : insight and judgment were intact [Affect] : the affect was normal [Person] : oriented to person [Place] : oriented to place [Time] : oriented to time [Concentration Intact] : normal concentrating ability [Visual Intact] : visual attention was ~T not ~L decreased [Naming Objects] : no difficulty naming common objects [Repeating Phrases] : no difficulty repeating a phrase [Writing A Sentence] : no difficulty writing a sentence [Fluency] : fluency intact [Comprehension] : comprehension intact [Reading] : reading intact [Past History] : adequate knowledge of personal past history [Cranial Nerves Oculomotor (III)] : extraocular motion intact [Cranial Nerves Optic (II)] : visual acuity intact bilaterally,  visual fields full to confrontation, pupils equal round and reactive to light [Cranial Nerves Trigeminal (V)] : facial sensation intact symmetrically [Cranial Nerves Facial (VII)] : face symmetrical [Cranial Nerves Glossopharyngeal (IX)] : tongue and palate midline [Cranial Nerves Vestibulocochlear (VIII)] : hearing was intact bilaterally [Cranial Nerves Accessory (XI - Cranial And Spinal)] : head turning and shoulder shrug symmetric [Cranial Nerves Hypoglossal (XII)] : there was no tongue deviation with protrusion [Motor Strength] : muscle strength was normal in all four extremities [No Muscle Atrophy] : normal bulk in all four extremities [Motor Handedness Right-Handed] : the patient is right hand dominant [Paresis Pronator Drift Right-Sided] : no pronator drift on the right [Paresis Pronator Drift Left-Sided] : no pronator drift on the left [Sensation Tactile Decrease] : light touch was intact [Balance] : balance was intact [Past-pointing] : there was no past-pointing [Tremor] : no tremor present [Dysdiadochokinesia Bilaterally] : not present [Coordination - Dysmetria Impaired Finger-to-Nose Bilateral] : not present [2+] : Ankle jerk left 2+ [Plantar Reflex Right Only] : normal on the right [Plantar Reflex Left Only] : normal on the left [Neck Appearance] : the appearance of the neck was normal [] : the neck was supple [Arterial Pulses Carotid] : carotid pulses were normal with no bruits [Full Pulse] : the pedal pulses are present [No Visual Abnormalities] : no visible abnormalities [No Tenderness to Palpation] : no spine tenderness on palpation [Full ROM] : full ROM [No Pain with ROM] : no pain with motion in any direction [Abnormal Walk] : normal gait [Nail Clubbing] : no clubbing  or cyanosis of the fingernails [Involuntary Movements] : no involuntary movements were seen [Musculoskeletal - Swelling] : no joint swelling seen [Motor Tone] : muscle strength and tone were normal [Skin Color & Pigmentation] : normal skin color and pigmentation [Skin Turgor] : normal skin turgor

## 2021-08-19 NOTE — ASSESSMENT
[FreeTextEntry1] : 26-year-old man with chronic daily headaches worse in intensity and frequency, only affecting the right side of the head. Examination is unremarkable. Probable primary headache, but would like to rule out an intracranial process.Rule out SOL.possible minor headache,hemicrania continua.\par Plan: MRI of brain,with and without gadolinium\par Samples of Ubrelvy 50 mg, one tablet p.o. p.r.n. headache, continued to hours.\par Sample of note the 75 mg, one tablet p.o. q.d. p.r.n. headache.\par return to office, 2- 3 weeks. [facility]

## 2021-08-19 NOTE — REVIEW OF SYSTEMS
[As Noted in HPI] : as noted in HPI [Neck Pain] : neck pain [Back Pain] : ~T back pain [Negative] : Heme/Lymph

## 2021-09-07 ENCOUNTER — NON-APPOINTMENT (OUTPATIENT)
Age: 26
End: 2021-09-07

## 2021-09-07 ENCOUNTER — APPOINTMENT (OUTPATIENT)
Dept: GASTROENTEROLOGY | Facility: CLINIC | Age: 26
End: 2021-09-07
Payer: MEDICAID

## 2021-09-07 VITALS
TEMPERATURE: 98.7 F | BODY MASS INDEX: 16.25 KG/M2 | SYSTOLIC BLOOD PRESSURE: 110 MMHG | DIASTOLIC BLOOD PRESSURE: 70 MMHG | WEIGHT: 120 LBS | OXYGEN SATURATION: 99 % | HEART RATE: 70 BPM | HEIGHT: 72 IN

## 2021-09-07 DIAGNOSIS — R10.9 UNSPECIFIED ABDOMINAL PAIN: ICD-10-CM

## 2021-09-07 DIAGNOSIS — K50.90 CROHN'S DISEASE, UNSPECIFIED, W/OUT COMPLICATIONS: ICD-10-CM

## 2021-09-07 LAB
25(OH)D3 SERPL-MCNC: 17.8 NG/ML
ALBUMIN SERPL ELPH-MCNC: 4.8 G/DL
ALP BLD-CCNC: 58 U/L
ALT SERPL-CCNC: 14 U/L
ANION GAP SERPL CALC-SCNC: 13 MMOL/L
AST SERPL-CCNC: 15 U/L
BASOPHILS # BLD AUTO: 0.06 K/UL
BASOPHILS NFR BLD AUTO: 1 %
BILIRUB SERPL-MCNC: 1.5 MG/DL
BUN SERPL-MCNC: 8 MG/DL
CALCIUM SERPL-MCNC: 10.4 MG/DL
CHLORIDE SERPL-SCNC: 105 MMOL/L
CO2 SERPL-SCNC: 24 MMOL/L
CREAT SERPL-MCNC: 1.01 MG/DL
CRP SERPL-MCNC: <3 MG/L
EOSINOPHIL # BLD AUTO: 0.24 K/UL
EOSINOPHIL NFR BLD AUTO: 4.1 %
GLUCOSE SERPL-MCNC: 87 MG/DL
HCT VFR BLD CALC: 46.8 %
HGB BLD-MCNC: 15.9 G/DL
IMM GRANULOCYTES NFR BLD AUTO: 0 %
LYMPHOCYTES # BLD AUTO: 1.79 K/UL
LYMPHOCYTES NFR BLD AUTO: 30.2 %
MAN DIFF?: NORMAL
MCHC RBC-ENTMCNC: 29.8 PG
MCHC RBC-ENTMCNC: 34 GM/DL
MCV RBC AUTO: 87.6 FL
MONOCYTES # BLD AUTO: 0.5 K/UL
MONOCYTES NFR BLD AUTO: 8.4 %
NEUTROPHILS # BLD AUTO: 3.33 K/UL
NEUTROPHILS NFR BLD AUTO: 56.3 %
PLATELET # BLD AUTO: 298 K/UL
POTASSIUM SERPL-SCNC: 4.6 MMOL/L
PROT SERPL-MCNC: 8.1 G/DL
RBC # BLD: 5.34 M/UL
RBC # FLD: 12.2 %
SODIUM SERPL-SCNC: 143 MMOL/L
VIT B12 SERPL-MCNC: 827 PG/ML
WBC # FLD AUTO: 5.92 K/UL

## 2021-09-07 PROCEDURE — 99214 OFFICE O/P EST MOD 30 MIN: CPT

## 2021-09-07 NOTE — ASSESSMENT
[FreeTextEntry1] : Weight loss, abdominal pain, suspect recurrent Crohn's\par \par Plan\par Blood testing as ordered\par MRI enterography as ordered\par Indications risks benefits and alternatives to colonoscopy reviewed\par Patient agreeable

## 2021-09-07 NOTE — PHYSICAL EXAM
[General Appearance - Alert] : alert [General Appearance - In No Acute Distress] : in no acute distress [Bowel Sounds] : normal bowel sounds [Abdomen Soft] : soft [FreeTextEntry1] : Mild tenderness right side without rebound or guarding [Oriented To Time, Place, And Person] : oriented to person, place, and time [Impaired Insight] : insight and judgment were intact [Affect] : the affect was normal

## 2021-09-07 NOTE — HISTORY OF PRESENT ILLNESS
[de-identified] : 26-year-old male,26-year-old male, several year history of isolated ileal Crohn's disease, diarrhea, weight loss history of small bowel Crohn's disease presents with recurrent abdominal pain, right-sided, weight loss\par \par Recent work-up for kidney stones negative including noncontrast CT of the abdomen\par \par Patient initially diagnosed at age 16\par Last colonoscopy 2015, no report of ileal intubation,: Normal

## 2021-09-08 LAB
M TB IFN-G BLD-IMP: NEGATIVE
QUANTIFERON TB PLUS MITOGEN MINUS NIL: 8.61 IU/ML
QUANTIFERON TB PLUS NIL: 0.01 IU/ML
QUANTIFERON TB PLUS TB1 MINUS NIL: 0 IU/ML
QUANTIFERON TB PLUS TB2 MINUS NIL: 0 IU/ML

## 2021-09-15 ENCOUNTER — APPOINTMENT (OUTPATIENT)
Dept: NEUROLOGY | Facility: CLINIC | Age: 26
End: 2021-09-15

## 2021-09-23 DIAGNOSIS — Z12.11 ENCOUNTER FOR SCREENING FOR MALIGNANT NEOPLASM OF COLON: ICD-10-CM

## 2021-09-27 ENCOUNTER — LABORATORY RESULT (OUTPATIENT)
Age: 26
End: 2021-09-27

## 2021-09-27 ENCOUNTER — APPOINTMENT (OUTPATIENT)
Dept: DISASTER EMERGENCY | Facility: CLINIC | Age: 26
End: 2021-09-27

## 2021-09-29 ENCOUNTER — APPOINTMENT (OUTPATIENT)
Dept: GASTROENTEROLOGY | Facility: AMBULATORY MEDICAL SERVICES | Age: 26
End: 2021-09-29
Payer: MEDICAID

## 2021-09-29 PROCEDURE — 45380 COLONOSCOPY AND BIOPSY: CPT

## 2021-10-05 ENCOUNTER — NON-APPOINTMENT (OUTPATIENT)
Age: 26
End: 2021-10-05

## 2021-10-05 ENCOUNTER — RESULT REVIEW (OUTPATIENT)
Age: 26
End: 2021-10-05

## 2021-10-29 ENCOUNTER — APPOINTMENT (OUTPATIENT)
Dept: MRI IMAGING | Facility: CLINIC | Age: 26
End: 2021-10-29

## 2021-11-12 ENCOUNTER — OUTPATIENT (OUTPATIENT)
Dept: OUTPATIENT SERVICES | Facility: HOSPITAL | Age: 26
LOS: 1 days | End: 2021-11-12
Payer: MEDICAID

## 2021-11-12 ENCOUNTER — APPOINTMENT (OUTPATIENT)
Dept: MRI IMAGING | Facility: CLINIC | Age: 26
End: 2021-11-12
Payer: MEDICAID

## 2021-11-12 DIAGNOSIS — K50.90 CROHN'S DISEASE, UNSPECIFIED, WITHOUT COMPLICATIONS: ICD-10-CM

## 2021-11-12 DIAGNOSIS — Z00.8 ENCOUNTER FOR OTHER GENERAL EXAMINATION: ICD-10-CM

## 2021-11-12 PROCEDURE — 74183 MRI ABD W/O CNTR FLWD CNTR: CPT | Mod: 26

## 2021-11-12 PROCEDURE — 74183 MRI ABD W/O CNTR FLWD CNTR: CPT

## 2021-11-12 PROCEDURE — 72196 MRI PELVIS W/DYE: CPT

## 2021-11-12 PROCEDURE — 72196 MRI PELVIS W/DYE: CPT | Mod: 26

## 2021-11-12 PROCEDURE — A9585: CPT

## 2021-11-17 ENCOUNTER — NON-APPOINTMENT (OUTPATIENT)
Age: 26
End: 2021-11-17

## 2022-01-21 ENCOUNTER — APPOINTMENT (OUTPATIENT)
Dept: PULMONOLOGY | Facility: CLINIC | Age: 27
End: 2022-01-21
Payer: MEDICAID

## 2022-01-21 VITALS
DIASTOLIC BLOOD PRESSURE: 70 MMHG | RESPIRATION RATE: 16 BRPM | WEIGHT: 125 LBS | HEIGHT: 72 IN | HEART RATE: 72 BPM | SYSTOLIC BLOOD PRESSURE: 104 MMHG | TEMPERATURE: 98 F | OXYGEN SATURATION: 99 % | BODY MASS INDEX: 16.93 KG/M2

## 2022-01-21 DIAGNOSIS — Z72.89 OTHER PROBLEMS RELATED TO LIFESTYLE: ICD-10-CM

## 2022-01-21 DIAGNOSIS — R07.89 OTHER CHEST PAIN: ICD-10-CM

## 2022-01-21 DIAGNOSIS — R05.3 CHRONIC COUGH: ICD-10-CM

## 2022-01-21 DIAGNOSIS — F12.90 CANNABIS USE, UNSPECIFIED, UNCOMPLICATED: ICD-10-CM

## 2022-01-21 PROCEDURE — 99214 OFFICE O/P EST MOD 30 MIN: CPT | Mod: 25

## 2022-01-21 PROCEDURE — 99406 BEHAV CHNG SMOKING 3-10 MIN: CPT

## 2022-01-21 RX ORDER — BUDESONIDE AND FORMOTEROL FUMARATE DIHYDRATE 160; 4.5 UG/1; UG/1
160-4.5 AEROSOL RESPIRATORY (INHALATION) TWICE DAILY
Qty: 1 | Refills: 1 | Status: COMPLETED | COMMUNITY
Start: 2022-01-21 | End: 2022-03-22

## 2022-01-21 NOTE — COUNSELING
[Patient motivation] : Patient motivation [Good understanding] : Patient has a good understanding of lifestyle changes and steps needed to achieve self management goal [FreeTextEntry1] : 4

## 2022-01-21 NOTE — ASSESSMENT
[FreeTextEntry1] : 26 Year old male Hx Crohn's disease, marijuana smoker presents with pleuritic chest pain, cough, shortness of breath\par \par Obtain CT angio evaluate for PE\par Initiate Symbicort 160/4.5 2 puffs  twice daily and rinse mouth\par PFT after CT angio\par \par Patient counseled on avoidance of marijuana products and smoking Of any kind including vaping\par \par Follow up pending CT angio

## 2022-01-21 NOTE — HISTORY OF PRESENT ILLNESS
[Current] : current [Former] : former [TextBox_4] : Patient is a 26 year old male Hx Crohn's disease, Hx childhood asthma, current everyday marijuana smoker, presents to [facility] for follow up. Patient reports he is vaccinated x 2.  He reports he is experiencing increased cough, pleuritic chest pain.  He reports he is not smoking marijuana regularly.  He had recent colonoscopy and MRI secondary active Crohn's.  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  He is here for these symptoms.

## 2022-07-07 ENCOUNTER — NON-APPOINTMENT (OUTPATIENT)
Age: 27
End: 2022-07-07

## 2022-07-08 ENCOUNTER — APPOINTMENT (OUTPATIENT)
Dept: RHEUMATOLOGY | Facility: CLINIC | Age: 27
End: 2022-07-08

## 2022-07-08 VITALS
BODY MASS INDEX: 17.61 KG/M2 | HEIGHT: 72 IN | TEMPERATURE: 96.1 F | OXYGEN SATURATION: 100 % | WEIGHT: 130 LBS | SYSTOLIC BLOOD PRESSURE: 100 MMHG | HEART RATE: 89 BPM | DIASTOLIC BLOOD PRESSURE: 70 MMHG

## 2022-07-08 DIAGNOSIS — R39.89 OTHER SYMPTOMS AND SIGNS INVOLVING THE GENITOURINARY SYSTEM: ICD-10-CM

## 2022-07-08 DIAGNOSIS — Z83.49 FAMILY HISTORY OF OTHER ENDOCRINE, NUTRITIONAL AND METABOLIC DISEASES: ICD-10-CM

## 2022-07-08 DIAGNOSIS — Z84.0 FAMILY HISTORY OF DISEASES OF THE SKIN AND SUBCUTANEOUS TISSUE: ICD-10-CM

## 2022-07-08 DIAGNOSIS — Z82.61 FAMILY HISTORY OF ARTHRITIS: ICD-10-CM

## 2022-07-08 DIAGNOSIS — H04.123 DRY EYE SYNDROME OF BILATERAL LACRIMAL GLANDS: ICD-10-CM

## 2022-07-08 DIAGNOSIS — L40.9 PSORIASIS, UNSPECIFIED: ICD-10-CM

## 2022-07-08 PROCEDURE — 99204 OFFICE O/P NEW MOD 45 MIN: CPT

## 2022-07-08 RX ORDER — TIZANIDINE 4 MG/1
4 TABLET ORAL
Qty: 30 | Refills: 1 | Status: ACTIVE | COMMUNITY
Start: 2022-07-08 | End: 1900-01-01

## 2022-07-08 RX ORDER — TRIAMCINOLONE ACETONIDE 1 MG/G
0.1 OINTMENT TOPICAL
Qty: 454 | Refills: 0 | Status: ACTIVE | COMMUNITY
Start: 2022-03-30

## 2022-07-08 RX ORDER — TACROLIMUS 1 MG/G
0.1 OINTMENT TOPICAL
Qty: 60 | Refills: 0 | Status: ACTIVE | COMMUNITY
Start: 2022-06-14

## 2022-07-08 RX ORDER — POLYETHYLENE GLYCOL-3350 AND ELECTROLYTES WITH FLAVOR PACK 240; 5.84; 2.98; 6.72; 22.72 G/278.26G; G/278.26G; G/278.26G; G/278.26G; G/278.26G
240 POWDER, FOR SOLUTION ORAL
Qty: 1 | Refills: 0 | Status: DISCONTINUED | COMMUNITY
Start: 2021-09-23 | End: 2022-07-08

## 2022-07-08 RX ORDER — FLUOCINONIDE 0.5 MG/G
0.05 CREAM TOPICAL
Qty: 60 | Refills: 0 | Status: ACTIVE | COMMUNITY
Start: 2022-06-14

## 2022-07-08 RX ORDER — SODIUM SULFATE, POTASSIUM SULFATE, MAGNESIUM SULFATE 17.5; 3.13; 1.6 G/ML; G/ML; G/ML
17.5-3.13-1.6 SOLUTION, CONCENTRATE ORAL
Qty: 1 | Refills: 0 | Status: DISCONTINUED | COMMUNITY
Start: 2021-09-07 | End: 2022-07-08

## 2022-08-16 ENCOUNTER — APPOINTMENT (OUTPATIENT)
Dept: RHEUMATOLOGY | Facility: CLINIC | Age: 27
End: 2022-08-16

## 2023-01-05 NOTE — ASSESSMENT
[FreeTextEntry1] : 25 year old male Hx Crohn's disease, current everyday marijuana smoker, use of vape presents for evaluation atrypical chest pain\par \par Obtain CXR\par PFT\par Ruisks of vaping discussed with patient.\par \par Follow up after CXR, PFT
Professional collateral

## 2023-02-03 NOTE — ED ADULT NURSE NOTE - PAIN: PRESENCE, MLM
complains of pain/discomfort Opzelura Counseling:  I discussed with the patient the risks of Opzelura including but not limited to nasopharngitis, bronchitis, ear infection, eosinophila, hives, diarrhea, folliculitis, tonsillitis, and rhinorrhea.  Taken orally, this medication has been linked to serious infections; higher rate of mortality; malignancy and lymphoproliferative disorders; major adverse cardiovascular events; thrombosis; thrombocytopenia, anemia, and neutropenia; and lipid elevations.

## 2023-04-20 ENCOUNTER — EMERGENCY (EMERGENCY)
Facility: HOSPITAL | Age: 28
LOS: 0 days | Discharge: HOME | End: 2023-04-21
Attending: EMERGENCY MEDICINE
Payer: MEDICAID

## 2023-04-20 VITALS — WEIGHT: 130.07 LBS | HEIGHT: 72 IN

## 2023-04-20 DIAGNOSIS — S92.352A DISPLACED FRACTURE OF FIFTH METATARSAL BONE, LEFT FOOT, INITIAL ENCOUNTER FOR CLOSED FRACTURE: ICD-10-CM
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
## 2023-04-20 DIAGNOSIS — K50.90 CROHN'S DISEASE, UNSPECIFIED, WITHOUT COMPLICATIONS: ICD-10-CM

## 2023-04-20 DIAGNOSIS — M79.672 PAIN IN LEFT FOOT: ICD-10-CM

## 2023-04-20 DIAGNOSIS — Z91.018 ALLERGY TO OTHER FOODS: ICD-10-CM
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
## 2023-04-20 PROCEDURE — 28470 CLTX METATARSAL FX WO MNP EA: CPT | Mod: 54

## 2023-04-20 PROCEDURE — 73630 X-RAY EXAM OF FOOT: CPT | Mod: LT

## 2023-04-20 PROCEDURE — 29515 APPLICATION SHORT LEG SPLINT: CPT | Mod: LT

## 2023-04-20 PROCEDURE — 73630 X-RAY EXAM OF FOOT: CPT | Mod: 26,LT

## 2023-04-20 PROCEDURE — 99283 EMERGENCY DEPT VISIT LOW MDM: CPT | Mod: 25

## 2023-04-20 PROCEDURE — 99285 EMERGENCY DEPT VISIT HI MDM: CPT | Mod: 57

## 2023-04-20 RX ORDER — ACETAMINOPHEN 500 MG
650 TABLET ORAL ONCE
Refills: 0 | Status: COMPLETED | OUTPATIENT
Start: 2023-04-20 | End: 2023-04-20

## 2023-04-20 RX ORDER — IBUPROFEN 200 MG
200 TABLET ORAL ONCE
Refills: 0 | Status: COMPLETED | OUTPATIENT
Start: 2023-04-20 | End: 2023-04-20

## 2023-04-20 RX ADMIN — Medication 200 MILLIGRAM(S): at 22:58

## 2023-04-20 RX ADMIN — Medication 650 MILLIGRAM(S): at 22:58

## 2023-04-20 NOTE — ED ADULT NURSE NOTE - OBJECTIVE STATEMENT
Patient presented to the ED c/o left foot/leg pain/injury. Patient reports he was playing tennis when he fell on his foot. Patient reports pain radiates from his foot to his leg. Patient reports he cannot bear weight on his foot/leg. Patient does not appear to be in any distress at this time. Pt awaiting for xray in wheelchair with ice pack applied to left foot. Medication administrated as per order.

## 2023-04-20 NOTE — ED STATDOCS - PATIENT PORTAL LINK FT
You can access the FollowMyHealth Patient Portal offered by [facility] by registering at the following website: [email or web address]. By joining [employer]’s FollowMyHealth portal, you will also be able to view your health information using other applications (apps) compatible with our system.

## 2023-04-20 NOTE — ED STATDOCS - CARE PROVIDER_API CALL
Johnnie Johns (DPM)  Orthopaedic Surgery Surgery  [address] [place] [address]  Phone: [phone]  Fax: [phone]  Follow Up Time: 4-6 Days

## 2023-04-20 NOTE — ED STATDOCS - NSFOLLOWUPINSTRUCTIONS_ED_ALL_ED_FT
Return to the Emergency Department for worsening or persistent symptoms, and/or ANY NEW OR CONCERNING SYMPTOMS. If you have issues obtaining follow up, please call: [phone] or [phone]  to obtain a doctor or specialist who takes your insurance in your area.    Metatarsal Fracture  Foot anatomy showing the metatarsal bones.  A metatarsal fracture is a break in one of the five bones that connect the toes to the rest of the foot. This may also be called a forefoot fracture. A metatarsal fracture may be:  A crack in the surface of the bone (stress fracture). This often occurs in athletes.  A break all the way through the bone (complete fracture).  The bone that connects to the little toe (fifth metatarsal) is most commonly fractured. Ballet dancers often fracture this bone.    What are the causes?  A metatarsal fracture may be caused by:  Sudden twisting of the foot.  Falling onto the foot.  Something heavy falling onto the foot.  Overuse or repetitive exercise.  What increases the risk?  This condition is more likely to develop in people who:  Play contact sports.  Do ballet.  Have a condition that causes the bones to become thin and brittle (osteoporosis).  Have a low calcium level.  What are the signs or symptoms?  Symptoms of this condition include:  Pain that gets worse when walking or standing.  Pain when pressing on the foot or moving the toes.  Swelling.  Bruising on the top or bottom of the foot.  How is this diagnosed?  This condition may be diagnosed based on:  Your symptoms.  Any recent foot injuries you have had.  A physical exam.  An X-ray of your foot. If you have a stress fracture, it may not show up on an X-ray, and you may need other imaging tests, such as:  A bone scan.  CT scan.  MRI.  How is this treated?  Treatment depends on how severe your fracture is and how the pieces of the broken bone line up with each other (alignment). Treatment may involve:  Wearing a cast, splint, or supportive boot on your foot.  Using crutches, and not putting any weight on your foot.  Having surgery to align broken bones (open reduction and internal fixation, ORIF).  Physical therapy.  Follow-up visits and X-rays to make sure you are healing.  Follow these instructions at home:  If you have a splint or a supportive boot:    Wear the splint or boot as told by your health care provider. Remove it only as told by your health care provider.  Loosen the splint or boot if your toes tingle, become numb, or turn cold and blue.  Keep the splint or boot clean.  If your splint or boot is not waterproof:  Do not let it get wet.  Cover it with a watertight covering when you take a bath or a shower.  If you have a cast:    Do not stick anything inside the cast to scratch your skin. Doing that increases your risk for infection.  Check the skin around the cast every day. Tell your health care provider about any concerns.  You may put lotion on dry skin around the edges of the cast. Do not put lotion on the skin underneath the cast.  Keep the cast clean.  If the cast is not waterproof:  Do not let it get wet.  Cover it with a watertight covering when you take a bath or a shower.  Activity    Do not use your affected leg to support your body weight until your health care provider says that you can. Use crutches as directed.  Ask your health care provider what activities are safe for you during recovery, and ask what activities you need to avoid.  Do physical therapy exercises as directed.  Driving    Do not drive or use heavy machinery while taking pain medicine.  Do not drive while wearing a cast, splint, or boot on a foot that you use for driving.  Managing pain, stiffness, and swelling    A bag of ice on a towel on the skin.  If directed, put ice on painful areas:  Put ice in a plastic bag.  Place a towel between your skin and the bag.  If you have a removable splint or boot, remove it as told by your health care provider.  If you have a cast, place a towel between your cast and the bag.  Leave the ice on for 20 minutes, 2–3 times a day.  Move your toes often to avoid stiffness and to lessen swelling.  Raise (elevate) your lower leg above the level of your heart while you are sitting or lying down.  General instructions    Do not put pressure on any part of the cast or splint until it is fully hardened. This may take several hours.  Take over-the-counter and prescription medicines only as told by your health care provider.  Do not use any products that contain nicotine or tobacco, such as cigarettes and e-cigarettes. These can delay bone healing. If you need help quitting, ask your health care provider.  Do not take baths, swim, or use a hot tub until your health care provider approves. Ask your health care provider if you may take showers.  Keep all follow-up visits as told by your health care provider. This is important.  Contact a health care provider if you have:  Pain that gets worse or does not get better with medicine.  A fever.  A bad smell coming from your cast or splint.  Get help right away if you have:  Any of the following in your toes or your foot, even after loosening your splint (if applicable):  Numbness.  Tingling.  Coldness.  Blue skin.  Redness or swelling that gets worse.  Pain that suddenly becomes severe.  Summary  A metatarsal fracture is a break in one of the five bones that connect the toes to the rest of the foot.  Treatment depends on how severe your fracture is and how the pieces of the broken bone line up with each other (alignment). This may include wearing a cast, splint, or supportive boot, or using crutches. Sometimes surgery is needed to align the bones.  Ice and elevate your foot to help lessen the pain and swelling.  Make sure you know what symptoms should cause you to get help right away.  This information is not intended to replace advice given to you by your health care provider. Make sure you discuss any questions you have with your health care provider.

## 2023-04-20 NOTE — ED ADULT NURSE NOTE - NSIMPLEMENTINTERV_GEN_ALL_ED
Implemented All Fall Risk Interventions:  [place] to call system. Call bell, personal items and telephone within reach. Instruct patient to call for assistance. Room bathroom lighting operational. Non-slip footwear when patient is off stretcher. Physically safe environment: no spills, clutter or unnecessary equipment. Stretcher in lowest position, wheels locked, appropriate side rails in place. Provide visual cue, wrist band, yellow gown, etc. Monitor gait and stability. Monitor for mental status changes and reorient to person, place, and time. Review medications for side effects contributing to fall risk. Reinforce activity limits and safety measures with patient and family.

## 2023-04-20 NOTE — ED ADULT TRIAGE NOTE - CHIEF COMPLAINT QUOTE
Patient presented to the ED c/o left foot/leg pain/injury. Patient reports he was playing tennis when he fell on his foot. Patient reports pain radiates from his foot to his leg. Patient reports he cannot bear weight on his foot/leg. Patient does not appear to be in any distress at this time.

## 2023-04-20 NOTE — ED STATDOCS - OBJECTIVE STATEMENT
28M hx crohns presents with L foot pain. Pt was playing pickleball, rolled L foot, unable to bear nicole weight on foot. Pt enies pain to ankle or knee. Has had hx of fx to lateral L foot in past before. No head injuries or other complaints

## 2023-04-20 NOTE — ED STATDOCS - PHYSICAL EXAMINATION
General: AAOx3, NAD  HEENT: NCAT  Cardiac: Normal rate, normal peripheral perfusion  Respiratory: Normal rate and effort  GI: Soft, nondistended  Neuro: No focal deficits. MOSS equally x4  MSK: +ttp al L 5th metatarsal base as well as distal portion, +5th MTP tenderness. No ankle/tibfib/knee ttp. NVI  Skin: No rash

## 2023-04-21 VITALS
HEART RATE: 72 BPM | SYSTOLIC BLOOD PRESSURE: 113 MMHG | OXYGEN SATURATION: 96 % | RESPIRATION RATE: 18 BRPM | TEMPERATURE: 98 F | DIASTOLIC BLOOD PRESSURE: 69 MMHG

## 2023-04-21 NOTE — ED PROCEDURE NOTE - CPROC ED INFORMED CONSENT1
Benefits, risks, and possible complications of procedure explained to patient/caregiver who verbalized understanding and gave verbal consent. 0 0.23 -3.67

## 2023-04-26 ENCOUNTER — APPOINTMENT (OUTPATIENT)
Dept: ORTHOPEDIC SURGERY | Facility: CLINIC | Age: 28
End: 2023-04-26
Payer: MEDICAID

## 2023-04-26 ENCOUNTER — NON-APPOINTMENT (OUTPATIENT)
Age: 28
End: 2023-04-26

## 2023-04-26 VITALS — WEIGHT: 130 LBS | BODY MASS INDEX: 17.61 KG/M2 | HEIGHT: 72 IN

## 2023-04-26 PROCEDURE — 99204 OFFICE O/P NEW MOD 45 MIN: CPT | Mod: 57

## 2023-04-26 PROCEDURE — 28470 CLTX METATARSAL FX WO MNP EA: CPT | Mod: T4

## 2023-04-26 NOTE — DISCUSSION/SUMMARY
[de-identified] : Today I had a lengthy discussion with the patient regarding their left foot fracture.I have addressed all the patient's concerns surrounding the pathology of their condition. I recommend that the patient utilize a stiff shoe. The patient was provided with the stiff shoe in the office today. I recommend that the patient utilize ice, NSAIDS PRN, and heat. They can also elevate their left ankle above the level of the heart. The patient can now transition out of the cast and she was given a long cam walking boot today to protect the fracture for the next month.  The patient can begin to weight-bear as tolerated with assistance with the cam boot on starting today.  Continue with aspirin 81 mg daily for DVT prophylaxis.  Continue with elevation and icing daily for swelling control and pain.  Physical therapy can begin.  The patient will follow-up in office in 4 weeks for new x-ray and to see how she is doing.  If she is doing well then and the x-rays are stable, we will get her out of the boot and into an ankle brace with regular sneakers fully weightbearing. \par \par The patient understood and verbally agreed to the treatment plan. All of their questions were answered and they were satisfied with the visit. The patient should call the office if they have any questions or experience worsening symptoms. \par

## 2023-04-26 NOTE — HISTORY OF PRESENT ILLNESS
[FreeTextEntry1] : The patient is a 28-year-old male who presents with left foot pain. He reports he was playing pickleball last week and tripped over his own foot. He went to the ER where he obtained XR images and was told he had a fracture. He presents to the office NWB, ambulating with crutches. \par He works in  at home.

## 2023-04-26 NOTE — ADDENDUM
[FreeTextEntry1] : I, HOMER MEEKS, acted solely as a scribe for Dr. Albin Manuel on this date 04/26/2023  .\par  \par All medical record entries made by the Scribe were at my, Dr. Albin Manuel, direction and personally dictated by me on 04/26/2023 . I have reviewed the chart and agree that the record accurately reflects my personal performance of the history, physical exam, assessment and plan. I have also personally directed, reviewed, and agreed with the chart.

## 2023-04-26 NOTE — PHYSICAL EXAM
[de-identified] : Left Foot Physical Examination:\par \par General: Alert and oriented x3.  In no acute distress.  Pleasant in nature with a normal affect.  No apparent respiratory distress. \par Erythema, Warmth, Rubor: Bruising\par Swelling: Lateral swelling\par \par ROM Ankle:\par 1. Dorsiflexion: 10 degrees\par 2. Plantarflexion: 40 degrees\par 3. Inversion: 30 degrees\par 4. Eversion: 30 degrees\par \par ROM of digits: Normal\par \par Pes Planus: Negative\par Pes Cavus: Negative\par \par Bunion: Negative\par Laurie's Bunion (Bunionette): Negative\par Hammer Toe Deformity/Deformities: Negative\par \par Tenderness to Palpation: \par 1. Heel Pain: Negative\par 2. Midfoot Pain: Negative\par 3. First MTP Joint: Negative\par 4. Lis Franc Joint: Negative\par Lateral tenderness\par Tenderness Metatarsals:\par 1st MT: Negative\par 2nd MT: Negative\par 3rd MT: Negative\par 4th MT: Negative\par 5th MT: Negative\par Base of the 5th MT: Negative\par \par Ligament Pain:\par 1. Lis Franc Ligament: Negative\par 2. Plantar Fascia Ligament: Negative\par \par Strength: \par 5/5 TA/GS/EHL/FHL/EDL/ADD/ABD\par \par Pulses: 2+ DP/PT Pulses\par \par Capillary Refill Toes: <2 seconds\par \par Neuro: Intact motor and sensory throughout\par \par Additional Test:\par 1. Andrade's Squeeze Test: Negative\par 2. Calcaneal Squeeze Test: Negative [de-identified] : 3 views x-rays left foot taken [facility] on 04/20/2023, reviewed, 4/26/2023: Oblique fracture of the left 5th metatarsal

## 2023-05-17 ENCOUNTER — APPOINTMENT (OUTPATIENT)
Dept: ORTHOPEDIC SURGERY | Facility: CLINIC | Age: 28
End: 2023-05-17
Payer: MEDICAID

## 2023-05-17 PROCEDURE — 73630 X-RAY EXAM OF FOOT: CPT | Mod: LT

## 2023-05-17 PROCEDURE — 99024 POSTOP FOLLOW-UP VISIT: CPT

## 2023-05-17 NOTE — PHYSICAL EXAM
[de-identified] : Left Foot Physical Examination:\par \par General: Alert and oriented x3.  In no acute distress.  Pleasant in nature with a normal affect.  No apparent respiratory distress. \par Erythema, Warmth, Rubor: Bruising\par Swelling: Lateral swelling\par \par ROM Ankle:\par 1. Dorsiflexion: 10 degrees\par 2. Plantarflexion: 40 degrees\par 3. Inversion: 30 degrees\par 4. Eversion: 30 degrees\par \par ROM of digits: Normal\par \par Pes Planus: Negative\par Pes Cavus: Negative\par \par Bunion: Negative\par Laurie's Bunion (Bunionette): Negative\par Hammer Toe Deformity/Deformities: Negative\par \par Tenderness to Palpation: \par 1. Heel Pain: Negative\par 2. Midfoot Pain: Negative\par 3. First MTP Joint: Negative\par 4. Lis Franc Joint: Negative\par Lateral tenderness\par Tenderness Metatarsals:\par 1st MT: Negative\par 2nd MT: Negative\par 3rd MT: Negative\par 4th MT: Negative\par 5th MT: Negative\par Base of the 5th MT: Negative\par \par Ligament Pain:\par 1. Lis Franc Ligament: Negative\par 2. Plantar Fascia Ligament: Negative\par \par Strength: \par 5/5 TA/GS/EHL/FHL/EDL/ADD/ABD\par \par Pulses: 2+ DP/PT Pulses\par \par Capillary Refill Toes: <2 seconds\par \par Neuro: Intact motor and sensory throughout\par \par Additional Test:\par 1. Andrade's Squeeze Test: Negative\par 2. Calcaneal Squeeze Test: Negative [de-identified] : 3 views x-rays left foot taken [facility] on 04/20/2023, reviewed, 4/26/2023: Oblique fracture of the left 5th metatarsal

## 2023-05-17 NOTE — ADDENDUM
[FreeTextEntry1] : I, HOMER MEEKS, acted solely as a scribe for Dr. Albin Manuel on this date 05/17/2023  .\par  \par All medical record entries made by the Scribe were at my, Dr. Albin Manuel, direction and personally dictated by me on 05/17/2023 . I have reviewed the chart and agree that the record accurately reflects my personal performance of the history, physical exam, assessment and plan. I have also personally directed, reviewed, and agreed with the chart.

## 2023-05-17 NOTE — HISTORY OF PRESENT ILLNESS
[FreeTextEntry1] : 05/17/2023: The patient is a 28 year old male who presents for a follow up evaluation of his left foot fracture. He reports he has been doing ok since his last visit, but he is still feeling pain. The pain has only slightly improved since the initial injury. He presents to the office in a stiff medical shoe and ambulating with crutches. \par \par \par 04/26/2023: The patient is a 28-year-old male who presents with left foot pain. He reports he was playing pickleball last week and tripped over his own foot. He went to the ER where he obtained XR images and was told he had a fracture. He presents to the office NWB, ambulating with crutches. 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

## 2023-05-17 NOTE — DISCUSSION/SUMMARY
[de-identified] : Today I had a lengthy discussion with the patient regarding their left foot fracture.I have addressed all the patient's concerns surrounding the pathology of their condition. I recommend that the patient utilize a stiff shoe. The patient was provided with the stiff shoe in the office today. I recommend that the patient utilize ice, NSAIDS PRN, and heat. They can also elevate their left ankle above the level of the heart. Continue with elevation and icing daily for swelling control and pain. \par \par I had a lengthy discussion with the patient regarding surgical and non-surgical treatment options, since he is still feeling pain. He will follow up in 2-3 weeks to re-assess his pain and reconvene on surgical intervention. A lengthy discussion was had about the surgery. All risks, benefits and alternatives to the recommended surgical procedure were discussed which include but are not limited to bleeding, infection, nerve damage, vascular damage, failure of the wound to heal, the need for further surgery, loss of limb, DVT, PE, loss of life as well as the risks associated with general anesthesia. \par \par Patient to call for ORIF left, 5th metatarsal. \par \par The patient understood and verbally agreed to the treatment plan. All of their questions were answered and they were satisfied with the visit. The patient should call the office if they have any questions or experience worsening symptoms. \par

## 2023-06-07 ENCOUNTER — APPOINTMENT (OUTPATIENT)
Dept: ORTHOPEDIC SURGERY | Facility: CLINIC | Age: 28
End: 2023-06-07
Payer: MEDICAID

## 2023-06-07 PROCEDURE — 73630 X-RAY EXAM OF FOOT: CPT | Mod: LT

## 2023-06-07 PROCEDURE — 99024 POSTOP FOLLOW-UP VISIT: CPT

## 2023-06-07 NOTE — HISTORY OF PRESENT ILLNESS
[FreeTextEntry1] : 6/7/23: The patient is a 28-year-old male who presents for follow-up evaluation of his left foot fracture. The patient states that his symptoms have improved since his previous visit, but continues to feel intermittent pain on the outside aspect of his foot. He presents today NWB and is ambulating with crutches. No other complaints. \par \par 05/17/2023: The patient is a 28 year old male who presents for a follow up evaluation of his left foot fracture. He reports he has been doing ok since his last visit, but he is still feeling pain. The pain has only slightly improved since the initial injury. He presents to the office in a stiff medical shoe and ambulating with crutches. \par \par 04/26/2023: The patient is a 28-year-old male who presents with left foot pain. He reports he was playing pickleball last week and tripped over his own foot. He went to the ER where he obtained XR images and was told he had a fracture. He presents to the office NWB, ambulating with crutches. 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

## 2023-06-07 NOTE — ADDENDUM
[FreeTextEntry1] : I, HOMER MEEKS, acted solely as a scribe for Dr. Albin Manuel on this date 06/07/2023  .\par  \par All medical record entries made by the Scribe were at my, Dr. Albin Manuel, direction and personally dictated by me on 06/07/2023 . I have reviewed the chart and agree that the record accurately reflects my personal performance of the history, physical exam, assessment and plan. I have also personally directed, reviewed, and agreed with the chart.

## 2023-06-07 NOTE — PHYSICAL EXAM
[de-identified] : Left Foot Physical Examination:\par \par General: Alert and oriented x3.  In no acute distress.  Pleasant in nature with a normal affect.  No apparent respiratory distress. \par Erythema, Warmth, Rubor: Bruising\par Swelling: Lateral swelling\par \par ROM Ankle:\par 1. Dorsiflexion: 10 degrees\par 2. Plantarflexion: 40 degrees\par 3. Inversion: 30 degrees\par 4. Eversion: 30 degrees\par \par ROM of digits: Normal\par \par Pes Planus: Negative\par Pes Cavus: Negative\par \par Bunion: Negative\par Laurie's Bunion (Bunionette): Negative\par Hammer Toe Deformity/Deformities: Negative\par \par Tenderness to Palpation: \par 1. Heel Pain: Negative\par 2. Midfoot Pain: Negative\par 3. First MTP Joint: Negative\par 4. Lis Franc Joint: Negative\par Lateral tenderness\par \par Tenderness Metatarsals:\par 1st MT: Negative\par 2nd MT: Negative\par 3rd MT: Negative\par 4th MT: Negative\par 5th MT: Negative\par Base of the 5th MT: Negative\par \par Ligament Pain:\par 1. Lis Franc Ligament: Negative\par 2. Plantar Fascia Ligament: Negative\par \par Strength: \par 5/5 TA/GS/EHL/FHL/EDL/ADD/ABD\par \par Pulses: 2+ DP/PT Pulses\par \par Capillary Refill Toes: <2 seconds\par \par Neuro: Intact motor and sensory throughout\par \par Additional Test:\par 1. Andrade's Squeeze Test: Negative\par 2. Calcaneal Squeeze Test: Negative [de-identified] : 3 views x-rays left foot reviewed, 6/7/2023: Oblique fracture of the left 5th metatarsal with mild callous.

## 2023-06-07 NOTE — DISCUSSION/SUMMARY
[de-identified] : Today I had a lengthy discussion with the patient regarding their left foot fracture. I have addressed all the patient's concerns surrounding the pathology of their condition. I reviewed the patient's XR imaging with them in great detail. We discussed transition from NWB to WBAT slowly over the course of the next 2-4 weeks. \par \par I recommend the patient undergo a course of physical therapy for the left foot  2-3 times a week for a total of 8-12 weeks. A prescription was given for the physical therapy today. He will begin transitioning with his physical therapist. \par \par The patient understood and verbally agreed to the treatment plan. All of their questions were answered and they were satisfied with the visit. The patient should call the office if they have any questions or experience worsening symptoms.\par \par Follow up in 6 weeks \par

## 2023-07-19 ENCOUNTER — APPOINTMENT (OUTPATIENT)
Dept: ORTHOPEDIC SURGERY | Facility: CLINIC | Age: 28
End: 2023-07-19
Payer: MEDICAID

## 2023-07-19 DIAGNOSIS — S92.352A DISPLACED FRACTURE OF FIFTH METATARSAL BONE, LEFT FOOT, INITIAL ENCOUNTER FOR CLOSED FRACTURE: ICD-10-CM

## 2023-07-19 PROCEDURE — 73630 X-RAY EXAM OF FOOT: CPT | Mod: LT

## 2023-07-19 PROCEDURE — 99024 POSTOP FOLLOW-UP VISIT: CPT

## 2023-07-19 RX ORDER — CALCITONIN SALMON 200 [IU]/1
200 SPRAY, METERED NASAL DAILY
Qty: 1 | Refills: 1 | Status: ACTIVE | COMMUNITY
Start: 2023-07-19 | End: 1900-01-01

## 2023-07-19 NOTE — ADDENDUM
[FreeTextEntry1] : I, HOMER MEEKS, acted solely as a scribe for Dr. Albin Manuel on this date 07/19/2023  .\par  \par All medical record entries made by the Scribe were at my, Dr. Albin Manuel, direction and personally dictated by me on 07/19/2023 . I have reviewed the chart and agree that the record accurately reflects my personal performance of the history, physical exam, assessment and plan. I have also personally directed, reviewed, and agreed with the chart.

## 2023-07-19 NOTE — HISTORY OF PRESENT ILLNESS
[FreeTextEntry1] : 7/19/23: The patient is a 28-year-old male who presents for follow-up evaluation of his left foot fracture. He reports that he is doing well with his symptoms having improved since his last visit. He usually has no pain, but walking causes some discomfort and pain. He reports that he has been PWB, walking with crutches. In physical therapy, he has been working on strengthening and progressive WB. He has been taking Vitamin D. He presents to the office in sneakers and ambulating with crutches. \par Mx Jade's, psoriasis. \par \par 6/7/23: The patient is a 28-year-old male who presents for follow-up evaluation of his left foot fracture. The patient states that his symptoms have improved since his previous visit, but continues to feel intermittent pain on the outside aspect of his foot. He presents today NWB and is ambulating with crutches. No other complaints. \par \par 05/17/2023: The patient is a 28 year old male who presents for a follow up evaluation of his left foot fracture. He reports he has been doing ok since his last visit, but he is still feeling pain. The pain has only slightly improved since the initial injury. He presents to the office in a stiff medical shoe and ambulating with crutches. \par \par 04/26/2023: The patient is a 28-year-old male who presents with left foot pain. He reports he was playing pickleball last week and tripped over his own foot. He went to the ER where he obtained XR images and was told he had a fracture. He presents to the office NWB, ambulating with crutches. 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

## 2023-07-19 NOTE — PHYSICAL EXAM
[de-identified] : Left Foot Physical Examination:\par \par General: Alert and oriented x3.  In no acute distress.  Pleasant in nature with a normal affect.  No apparent respiratory distress. \par Erythema, Warmth, Rubor: Bruising\par Swelling: Lateral swelling\par \par ROM Ankle:\par 1. Dorsiflexion: 10 degrees\par 2. Plantarflexion: 40 degrees\par 3. Inversion: 30 degrees\par 4. Eversion: 30 degrees\par \par ROM of digits: Normal\par \par Pes Planus: Negative\par Pes Cavus: Negative\par \par Bunion: Negative\par Laurie's Bunion (Bunionette): Negative\par Hammer Toe Deformity/Deformities: Negative\par \par Tenderness to Palpation: \par 1. Heel Pain: Negative\par 2. Midfoot Pain: Negative\par 3. First MTP Joint: Negative\par 4. Lis Franc Joint: Negative\par Lateral tenderness\par \par Tenderness Metatarsals:\par 1st MT: Negative\par 2nd MT: Negative\par 3rd MT: Negative\par 4th MT: Negative\par 5th MT: Negative\par Base of the 5th MT: Negative\par \par Ligament Pain:\par 1. Lis Franc Ligament: Negative\par 2. Plantar Fascia Ligament: Negative\par \par Strength: \par 5/5 TA/GS/EHL/FHL/EDL/ADD/ABD\par \par Pulses: 2+ DP/PT Pulses\par \par Capillary Refill Toes: <2 seconds\par \par Neuro: Intact motor and sensory throughout\par \par Additional Test:\par 1. Andrade's Squeeze Test: Negative\par 2. Calcaneal Squeeze Test: Negative [de-identified] : 3 views x-rays left foot reviewed, 07/19/2023: Oblique fracture of the left 5th metatarsal with mild callous, unchanged from last visit.

## 2023-07-19 NOTE — DISCUSSION/SUMMARY
[de-identified] : Today I had a lengthy discussion with the patient regarding their left foot fracture pain. I have addressed all the patient's concerns surrounding the pathology of their condition. I reviewed the patient's XR imaging with them in great detail. \par \par A discussion was had about shoe-wear modifications. I advised the patient to utilize a wide toed cross training sneaker that better accommodates the feet. I recommended New Balance or Hoka to the patient. We discussed operative and non-operative treatment plans. I recommend the patient take calcitonin, a prescription was provided to the patient, and he was given full instruction on it's use.  \par \par Due to the non-union of his fracture, I recommend the patient utilize a bone stimulator; the patient was provided with a prescription for a bone stimulator in the office today. \par \par The patient understood and verbally agreed to the treatment plan. All of their questions were answered and they were satisfied with the visit. The patient should call the office if they have any questions or experience worsening symptoms. \par \par Follow up in 4-6 weeks.

## 2023-08-23 ENCOUNTER — APPOINTMENT (OUTPATIENT)
Dept: ORTHOPEDIC SURGERY | Facility: CLINIC | Age: 28
End: 2023-08-23
Payer: MEDICAID

## 2023-08-23 PROCEDURE — 73630 X-RAY EXAM OF FOOT: CPT | Mod: LT

## 2023-08-23 PROCEDURE — 99213 OFFICE O/P EST LOW 20 MIN: CPT | Mod: 25

## 2023-08-23 NOTE — ADDENDUM
[FreeTextEntry1] : I, HOMER JEANNA, acted solely as a scribe for Dr. Albin Manuel on this date 08/23/2023  .   All medical record entries made by the Scribe were at my, Dr. Albin Manuel, direction and personally dictated by me on 08/23/2023 . I have reviewed the chart and agree that the record accurately reflects my personal performance of the history, physical exam, assessment and plan. I have also personally directed, reviewed, and agreed with the chart.

## 2023-08-23 NOTE — PHYSICAL EXAM
[de-identified] : Left Foot Physical Examination:  General: Alert and oriented x3.  In no acute distress.  Pleasant in nature with a normal affect.  No apparent respiratory distress.  Erythema, Warmth, Rubor: Bruising Swelling: Lateral swelling  ROM Ankle: 1. Dorsiflexion: 10 degrees 2. Plantarflexion: 40 degrees 3. Inversion: 30 degrees 4. Eversion: 30 degrees  ROM of digits: Normal  Pes Planus: Negative Pes Cavus: Negative  Bunion: Negative Laurie's Bunion (Bunionette): Negative Hammer Toe Deformity/Deformities: Negative  Tenderness to Palpation:  1. Heel Pain: Negative 2. Midfoot Pain: Negative 3. First MTP Joint: Negative 4. Lis Franc Joint: Negative Lateral tenderness  Tenderness Metatarsals: 1st MT: Negative 2nd MT: Negative 3rd MT: Negative 4th MT: Negative 5th MT: Negative Base of the 5th MT: Negative  Ligament Pain: 1. Lis Franc Ligament: Negative 2. Plantar Fascia Ligament: Negative  Strength:  5/5 TA/GS/EHL/FHL/EDL/ADD/ABD  Pulses: 2+ DP/PT Pulses  Capillary Refill Toes: <2 seconds  Neuro: Intact motor and sensory throughout  Additional Test: 1. Andrade's Squeeze Test: Negative 2. Calcaneal Squeeze Test: Negative [de-identified] : 3V of the left foot were ordered, obtained and reviewed by me today, 08/23/2023 , revealed: healing noted.

## 2023-08-23 NOTE — DISCUSSION/SUMMARY
[de-identified] : Today I had a lengthy discussion with the patient regarding their left foot fracture. I have addressed all the patient's concerns surrounding the pathology of their condition. I have reviewed the patient's XR imaging with them in great detail. At this time I would like to obtain advanced imaging of the patient's left foot to evaluate healing due to continued pain. A CT scan was ordered so I can find out more about the etiology of the patient's condition. The patient should follow up with the office after obtaining the CT scan. We will consider surgical planning. I recommend that the patient utilize ice, NSAIDS PRN, and heat. They can also elevate their LLE above the level of the heart.   A note was provided to the patient to keep working remote.    The patient understood and verbally agreed to the treatment plan. All of their questions were answered, and they were satisfied with the visit. The patient should call the office if they have any questions or experience worsening symptoms.

## 2023-08-23 NOTE — HISTORY OF PRESENT ILLNESS
[FreeTextEntry1] : 8/23/23: The patient is a 27 y/o male presenting for follow up of left foot fracture. He reports that his symptoms have remained relatively unchanged since his last visit. He is still having trouble walking and has trouble. He has been compliant with the bone stimulator. He presents to the office PWB in sneakers with crutches.   7/19/23: The patient is a 28-year-old male who presents for follow-up evaluation of his left foot fracture. He reports that he is doing well with his symptoms having improved since his last visit. He usually has no pain, but walking causes some discomfort and pain. He reports that he has been PWB, walking with crutches. In physical therapy, he has been working on strengthening and progressive WB. He has been taking Vitamin D. He presents to the office in sneakers and ambulating with crutches.  Mx Hansen's, psoriasis.   6/7/23: The patient is a 28-year-old male who presents for follow-up evaluation of his left foot fracture. The patient states that his symptoms have improved since his previous visit, but continues to feel intermittent pain on the outside aspect of his foot. He presents today NWB and is ambulating with crutches. No other complaints.   05/17/2023: The patient is a 28 year old male who presents for a follow up evaluation of his left foot fracture. He reports he has been doing ok since his last visit, but he is still feeling pain. The pain has only slightly improved since the initial injury. He presents to the office in a stiff medical shoe and ambulating with crutches.   04/26/2023: The patient is a 28-year-old male who presents with left foot pain. He reports he was playing pickleball last week and tripped over his own foot. He went to the ER where he obtained XR images and was told he had a fracture. He presents to the office NWB, ambulating with crutches.  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

## 2023-08-30 ENCOUNTER — OUTPATIENT (OUTPATIENT)
Dept: OUTPATIENT SERVICES | Facility: HOSPITAL | Age: 28
LOS: 1 days | End: 2023-08-30
Payer: MEDICAID

## 2023-08-30 ENCOUNTER — RESULT REVIEW (OUTPATIENT)
Age: 28
End: 2023-08-30

## 2023-08-30 ENCOUNTER — APPOINTMENT (OUTPATIENT)
Dept: CT IMAGING | Facility: CLINIC | Age: 28
End: 2023-08-30
Payer: MEDICAID

## 2023-08-30 DIAGNOSIS — Z00.8 ENCOUNTER FOR OTHER GENERAL EXAMINATION: ICD-10-CM

## 2023-08-30 DIAGNOSIS — S92.352K DISPLACED FRACTURE OF FIFTH METATARSAL BONE, LEFT FOOT, SUBSEQUENT ENCOUNTER FOR FRACTURE WITH NONUNION: ICD-10-CM

## 2023-08-30 PROCEDURE — 76376 3D RENDER W/INTRP POSTPROCES: CPT

## 2023-08-30 PROCEDURE — 73700 CT LOWER EXTREMITY W/O DYE: CPT

## 2023-08-30 PROCEDURE — 73700 CT LOWER EXTREMITY W/O DYE: CPT | Mod: 26,LT

## 2023-08-30 PROCEDURE — 76376 3D RENDER W/INTRP POSTPROCES: CPT | Mod: 26

## 2023-09-11 ENCOUNTER — APPOINTMENT (OUTPATIENT)
Dept: ORTHOPEDIC SURGERY | Facility: CLINIC | Age: 28
End: 2023-09-11
Payer: MEDICAID

## 2023-09-11 PROCEDURE — 99441: CPT

## 2023-12-18 ENCOUNTER — APPOINTMENT (OUTPATIENT)
Dept: ORTHOPEDIC SURGERY | Facility: CLINIC | Age: 28
End: 2023-12-18
Payer: MEDICAID

## 2023-12-18 VITALS
BODY MASS INDEX: 17.61 KG/M2 | HEIGHT: 72 IN | HEART RATE: 71 BPM | DIASTOLIC BLOOD PRESSURE: 67 MMHG | WEIGHT: 130 LBS | SYSTOLIC BLOOD PRESSURE: 116 MMHG

## 2023-12-18 DIAGNOSIS — S92.352K DISPLACED FRACTURE OF FIFTH METATARSAL BONE, LEFT FOOT, SUBSEQUENT ENCOUNTER FOR FRACTURE WITH NONUNION: ICD-10-CM

## 2023-12-18 PROCEDURE — 99213 OFFICE O/P EST LOW 20 MIN: CPT | Mod: 25

## 2023-12-18 PROCEDURE — 73630 X-RAY EXAM OF FOOT: CPT | Mod: LT

## 2023-12-18 NOTE — PHYSICAL EXAM
[de-identified] : Left Foot Physical Examination:  General: Alert and oriented x3.  In no acute distress.  Pleasant in nature with a normal affect.  No apparent respiratory distress.  Erythema, Warmth, Rubor: Bruising Swelling: Lateral swelling  ROM Ankle: 1. Dorsiflexion: 10 degrees 2. Plantarflexion: 40 degrees 3. Inversion: 30 degrees 4. Eversion: 30 degrees  ROM of digits: Normal  Pes Planus: Negative Pes Cavus: Negative  Bunion: Negative Laurie's Bunion (Bunionette): Negative Hammer Toe Deformity/Deformities: Negative  Tenderness to Palpation:  1. Heel Pain: Negative 2. Midfoot Pain: Negative 3. First MTP Joint: Negative 4. Lis Franc Joint: Negative Lateral tenderness  Tenderness Metatarsals: 1st MT: Negative 2nd MT: Negative 3rd MT: Negative 4th MT: Negative 5th MT: slight tenderness Base of the 5th MT: Negative  Ligament Pain: 1. Lis Franc Ligament: Negative 2. Plantar Fascia Ligament: Negative  Strength:  5/5 TA/GS/EHL/FHL/EDL/ADD/ABD  Pulses: 2+ DP/PT Pulses  Capillary Refill Toes: <2 seconds  Neuro: Intact motor and sensory throughout  Additional Test: 1. Andrade's Squeeze Test: Negative 2. Calcaneal Squeeze Test: Negative [de-identified] : 3V of the left foot were ordered, obtained and reviewed by me today, 12/18/2023 , revealed: healing fifth metatarsal fracture

## 2023-12-18 NOTE — HISTORY OF PRESENT ILLNESS
[FreeTextEntry1] : 12/18/2023: The patient is a 28 year old male presenting for a follow-up for a left foot fracture. The patient states that his symptoms have improved since his previous visit but continues to experience achiness over the fracture site. The patient presents today wearing sneakers and is ambulating without assistance/ No other complaints.   8/23/23: The patient is a 27 y/o male presenting for follow up of left foot fracture. He reports that his symptoms have remained relatively unchanged since his last visit. He is still having trouble walking and has trouble. He has been compliant with the bone stimulator. He presents to the office PWB in sneakers with crutches.   7/19/23: The patient is a 28-year-old male who presents for follow-up evaluation of his left foot fracture. He reports that he is doing well with his symptoms having improved since his last visit. He usually has no pain, but walking causes some discomfort and pain. He reports that he has been PWB, walking with crutches. In physical therapy, he has been working on strengthening and progressive WB. He has been taking Vitamin D. He presents to the office in sneakers and ambulating with crutches.  Mx Hansen's, psoriasis.   6/7/23: The patient is a 28-year-old male who presents for follow-up evaluation of his left foot fracture. The patient states that his symptoms have improved since his previous visit, but continues to feel intermittent pain on the outside aspect of his foot. He presents today NWB and is ambulating with crutches. No other complaints.   05/17/2023: The patient is a 28 year old male who presents for a follow up evaluation of his left foot fracture. He reports he has been doing ok since his last visit, but he is still feeling pain. The pain has only slightly improved since the initial injury. He presents to the office in a stiff medical shoe and ambulating with crutches.   04/26/2023: The patient is a 28-year-old male who presents with left foot pain. He reports he was playing pickleball last week and tripped over his own foot. He went to the ER where he obtained XR images and was told he had a fracture. He presents to the office NWB, ambulating with crutches.  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

## 2023-12-18 NOTE — DISCUSSION/SUMMARY
[de-identified] : Today I had a lengthy discussion with the patient regarding their left foot fracture. I have addressed all the patient's concerns surrounding the pathology of their condition. XR imaging was completed in office today and the results were reviewed with the patient. At this time the patient can return to activities without any restrictions. I recommend that the patient utilize ice, NSAIDS PRN, and heat. The patient understood and verbally agreed to the treatment plan. All of their questions were answered, and they were satisfied with the visit. The patient should call the office if they have any questions or experience worsening symptoms.  I would like to see the patient back in the office PRN to reassess their condition.

## 2024-01-24 NOTE — ED ADULT NURSE NOTE - HOW OFTEN DO YOU HAVE A DRINK CONTAINING ALCOHOL?
Patient Education   Personalized Prevention Plan  You are due for the preventive services outlined below.  Your care team is available to assist you in scheduling these services.  If you have already completed any of these items, please share that information with your care team to update in your medical record.  Health Maintenance Due   Topic Date Due     Osteoporosis Screening  Never done     Heart Failure Action Plan  Never done     Discuss Advance Care Planning  Never done     Pneumococcal Vaccine (1 of 2 - PCV) Never done     Hepatitis C Screening  Never done     Zoster (Shingles) Vaccine (1 of 2) Never done     Diptheria Tetanus Pertussis (DTAP/TDAP/TD) Vaccine (1 - Tdap) 04/05/2006     RSV VACCINE (Pregnancy & 60+) (1 - 1-dose 60+ series) Never done     Annual Wellness Visit  Never done     Basic Metabolic Panel  07/13/2023     Flu Vaccine (1) Never done     COVID-19 Vaccine (3 - 2023-24 season) 09/01/2023     Liver Monitoring Lab  01/13/2024     Cholesterol Lab  02/17/2024         Never

## 2024-11-12 NOTE — ED STATDOCS - CLINICAL SUMMARY MEDICAL DECISION MAKING FREE TEXT BOX
Walk in Pt with L foot injury, XR showing mid 5th metatarsal fx. Consulted with podiatry, who reviewed images with Dr Mcdonald, rec posterior splint here and otpt fu for reeval next week. Pt agreeable with plan. Splint performed by self, crutches given, NWB LLE, fu podiatry [facility]